# Patient Record
Sex: FEMALE | Race: WHITE | Employment: FULL TIME | URBAN - METROPOLITAN AREA
[De-identification: names, ages, dates, MRNs, and addresses within clinical notes are randomized per-mention and may not be internally consistent; named-entity substitution may affect disease eponyms.]

---

## 2017-03-29 DIAGNOSIS — M85.80 OSTEOPENIA: Primary | ICD-10-CM

## 2017-03-30 ENCOUNTER — HOSPITAL ENCOUNTER (OUTPATIENT)
Dept: INFUSION THERAPY | Age: 52
Discharge: HOME | End: 2017-03-30
Attending: INTERNAL MEDICINE | Admitting: INTERNAL MEDICINE
Payer: COMMERCIAL

## 2017-03-30 ENCOUNTER — OFFICE VISIT (OUTPATIENT)
Dept: ONCOLOGY | Age: 52
End: 2017-03-30
Payer: COMMERCIAL

## 2017-03-30 ENCOUNTER — HOSPITAL ENCOUNTER (OUTPATIENT)
Age: 52
Discharge: HOME | End: 2017-03-30
Attending: INTERNAL MEDICINE
Payer: COMMERCIAL

## 2017-03-30 VITALS
DIASTOLIC BLOOD PRESSURE: 95 MMHG | SYSTOLIC BLOOD PRESSURE: 135 MMHG | RESPIRATION RATE: 16 BRPM | TEMPERATURE: 97.9 F | HEART RATE: 63 BPM | WEIGHT: 136.1 LBS | BODY MASS INDEX: 24.89 KG/M2

## 2017-03-30 DIAGNOSIS — D05.12 DUCTAL CARCINOMA IN SITU OF LEFT BREAST: ICD-10-CM

## 2017-03-30 DIAGNOSIS — M85.80 OSTEOPENIA: ICD-10-CM

## 2017-03-30 DIAGNOSIS — C50.412 MALIGNANT NEOPLASM OF UPPER-OUTER QUADRANT OF LEFT FEMALE BREAST (HCC): ICD-10-CM

## 2017-03-30 DIAGNOSIS — N64.4 BREAST PAIN: ICD-10-CM

## 2017-03-30 DIAGNOSIS — C50.412 MALIGNANT NEOPLASM OF UPPER-OUTER QUADRANT OF LEFT FEMALE BREAST (HCC): Primary | ICD-10-CM

## 2017-03-30 LAB
ALBUMIN SERPL-MCNC: 4.6 G/DL (ref 3.5–5.2)
ALBUMIN/GLOBULIN RATIO: 1.4 (ref 1–2.5)
ALP BLD-CCNC: 58 U/L (ref 35–104)
ALT SERPL-CCNC: 18 U/L (ref 5–33)
ANION GAP SERPL CALCULATED.3IONS-SCNC: 15 MMOL/L (ref 9–17)
AST SERPL-CCNC: 23 U/L
BILIRUB SERPL-MCNC: 0.49 MG/DL (ref 0.3–1.2)
BUN BLDV-MCNC: 15 MG/DL (ref 6–20)
BUN/CREAT BLD: NORMAL (ref 9–20)
CALCIUM SERPL-MCNC: 9.5 MG/DL (ref 8.6–10.4)
CHLORIDE BLD-SCNC: 99 MMOL/L (ref 98–107)
CO2: 25 MMOL/L (ref 20–31)
CREAT SERPL-MCNC: 0.68 MG/DL (ref 0.5–0.9)
GFR AFRICAN AMERICAN: >60 ML/MIN
GFR NON-AFRICAN AMERICAN: >60 ML/MIN
GFR SERPL CREATININE-BSD FRML MDRD: NORMAL ML/MIN/{1.73_M2}
GFR SERPL CREATININE-BSD FRML MDRD: NORMAL ML/MIN/{1.73_M2}
GLUCOSE BLD-MCNC: 90 MG/DL (ref 70–99)
MAGNESIUM: 2 MG/DL (ref 1.6–2.6)
PHOSPHORUS: 3.1 MG/DL (ref 2.6–4.5)
POTASSIUM SERPL-SCNC: 3.8 MMOL/L (ref 3.7–5.3)
SODIUM BLD-SCNC: 139 MMOL/L (ref 135–144)
TOTAL PROTEIN: 7.8 G/DL (ref 6.4–8.3)

## 2017-03-30 PROCEDURE — 96401 CHEMO ANTI-NEOPL SQ/IM: CPT

## 2017-03-30 PROCEDURE — 80053 COMPREHEN METABOLIC PANEL: CPT

## 2017-03-30 PROCEDURE — 83735 ASSAY OF MAGNESIUM: CPT

## 2017-03-30 PROCEDURE — 36415 COLL VENOUS BLD VENIPUNCTURE: CPT

## 2017-03-30 PROCEDURE — 6360000002 HC RX W HCPCS: Performed by: INTERNAL MEDICINE

## 2017-03-30 PROCEDURE — 99214 OFFICE O/P EST MOD 30 MIN: CPT | Performed by: INTERNAL MEDICINE

## 2017-03-30 PROCEDURE — 84100 ASSAY OF PHOSPHORUS: CPT

## 2017-03-30 RX ORDER — METHYLPREDNISOLONE SODIUM SUCCINATE 125 MG/2ML
125 INJECTION, POWDER, LYOPHILIZED, FOR SOLUTION INTRAMUSCULAR; INTRAVENOUS ONCE
Status: CANCELLED | OUTPATIENT
Start: 2017-09-24 | End: 2017-09-24

## 2017-03-30 RX ORDER — 0.9 % SODIUM CHLORIDE 0.9 %
10 VIAL (ML) INJECTION ONCE
Status: CANCELLED | OUTPATIENT
Start: 2017-09-24 | End: 2017-09-24

## 2017-03-30 RX ORDER — SODIUM CHLORIDE 9 MG/ML
100 INJECTION, SOLUTION INTRAVENOUS CONTINUOUS
Status: CANCELLED | OUTPATIENT
Start: 2017-09-24

## 2017-03-30 RX ORDER — DIPHENHYDRAMINE HYDROCHLORIDE 50 MG/ML
50 INJECTION INTRAMUSCULAR; INTRAVENOUS ONCE
Status: CANCELLED | OUTPATIENT
Start: 2017-09-24 | End: 2017-09-24

## 2017-03-30 RX ADMIN — DENOSUMAB 60 MG: 60 INJECTION SUBCUTANEOUS at 11:55

## 2017-03-30 NOTE — PROGRESS NOTES
Pt here for Prolia injection. Labs reviewed. Pt was treated without incident and discharged in stable condition. Pt will return on 10/5/17 for her next Prolia injection.

## 2017-03-31 DIAGNOSIS — C50.419 MALIGNANT NEOPLASM OF UPPER-OUTER QUADRANT OF FEMALE BREAST, UNSPECIFIED LATERALITY: Primary | ICD-10-CM

## 2017-03-31 DIAGNOSIS — C50.412 MALIGNANT NEOPLASM OF UPPER-OUTER QUADRANT OF LEFT FEMALE BREAST (HCC): Primary | ICD-10-CM

## 2017-03-31 RX ORDER — TAMOXIFEN CITRATE 20 MG/1
20 TABLET ORAL DAILY
Qty: 90 TABLET | Refills: 3 | Status: SHIPPED | OUTPATIENT
Start: 2017-03-31 | End: 2018-07-23 | Stop reason: SDUPTHER

## 2017-03-31 RX ORDER — TRIAMTERENE AND HYDROCHLOROTHIAZIDE 37.5; 25 MG/1; MG/1
1 CAPSULE ORAL DAILY
Qty: 30 CAPSULE | Refills: 2 | Status: SHIPPED | OUTPATIENT
Start: 2017-03-31 | End: 2017-07-18 | Stop reason: SDUPTHER

## 2017-04-17 ENCOUNTER — HOSPITAL ENCOUNTER (OUTPATIENT)
Dept: MRI IMAGING | Age: 52
Discharge: HOME | End: 2017-04-17
Payer: COMMERCIAL

## 2017-04-17 DIAGNOSIS — C50.412 MALIGNANT NEOPLASM OF UPPER-OUTER QUADRANT OF LEFT FEMALE BREAST (HCC): ICD-10-CM

## 2017-04-17 PROCEDURE — C8908 MRI W/O FOL W/CONT, BREAST,: HCPCS

## 2017-04-17 PROCEDURE — 2580000003 HC RX 258: Performed by: INTERNAL MEDICINE

## 2017-04-17 PROCEDURE — A9579 GAD-BASE MR CONTRAST NOS,1ML: HCPCS | Performed by: INTERNAL MEDICINE

## 2017-04-17 PROCEDURE — 6360000004 HC RX CONTRAST MEDICATION: Performed by: INTERNAL MEDICINE

## 2017-04-17 RX ORDER — SODIUM CHLORIDE 0.9 % (FLUSH) 0.9 %
10 SYRINGE (ML) INJECTION PRN
Status: DISCONTINUED | OUTPATIENT
Start: 2017-04-17 | End: 2017-04-20 | Stop reason: HOSPADM

## 2017-04-17 RX ORDER — 0.9 % SODIUM CHLORIDE 0.9 %
100 INTRAVENOUS SOLUTION INTRAVENOUS ONCE
Status: COMPLETED | OUTPATIENT
Start: 2017-04-17 | End: 2017-04-17

## 2017-04-17 RX ADMIN — Medication 10 ML: at 17:58

## 2017-04-17 RX ADMIN — GADOPENTETATE DIMEGLUMINE 20 ML: 469.01 INJECTION INTRAVENOUS at 17:57

## 2017-04-17 RX ADMIN — SODIUM CHLORIDE 100 ML: 0.9 INJECTION, SOLUTION INTRAVENOUS at 17:58

## 2017-05-25 ENCOUNTER — EMPLOYEE WELLNESS (OUTPATIENT)
Dept: OTHER | Age: 52
End: 2017-05-25

## 2017-05-25 LAB
CHOLESTEROL/HDL RATIO: 3.3
CHOLESTEROL: 177 MG/DL
GLUCOSE BLD-MCNC: 92 MG/DL (ref 70–99)
HDLC SERPL-MCNC: 53 MG/DL
LDL CHOLESTEROL: 89 MG/DL (ref 0–130)
PATIENT FASTING?: YES
TRIGL SERPL-MCNC: 173 MG/DL
VLDLC SERPL CALC-MCNC: ABNORMAL MG/DL (ref 1–30)

## 2017-07-19 RX ORDER — TRIAMTERENE AND HYDROCHLOROTHIAZIDE 37.5; 25 MG/1; MG/1
CAPSULE ORAL
Qty: 30 CAPSULE | Refills: 0 | Status: SHIPPED | OUTPATIENT
Start: 2017-07-19 | End: 2017-08-31 | Stop reason: SDUPTHER

## 2017-08-30 ENCOUNTER — TELEPHONE (OUTPATIENT)
Dept: ONCOLOGY | Age: 52
End: 2017-08-30

## 2017-08-31 DIAGNOSIS — C50.419 MALIGNANT NEOPLASM OF UPPER-OUTER QUADRANT OF FEMALE BREAST, UNSPECIFIED LATERALITY: Primary | ICD-10-CM

## 2017-08-31 RX ORDER — TRIAMTERENE AND HYDROCHLOROTHIAZIDE 37.5; 25 MG/1; MG/1
1 CAPSULE ORAL DAILY
Qty: 90 CAPSULE | Refills: 3 | Status: SHIPPED | OUTPATIENT
Start: 2017-08-31 | End: 2018-09-20

## 2017-10-03 ENCOUNTER — OFFICE VISIT (OUTPATIENT)
Dept: FAMILY MEDICINE CLINIC | Age: 52
End: 2017-10-03
Payer: COMMERCIAL

## 2017-10-03 VITALS
SYSTOLIC BLOOD PRESSURE: 124 MMHG | DIASTOLIC BLOOD PRESSURE: 78 MMHG | WEIGHT: 143 LBS | HEIGHT: 62 IN | TEMPERATURE: 98 F | HEART RATE: 84 BPM | BODY MASS INDEX: 26.31 KG/M2 | OXYGEN SATURATION: 98 %

## 2017-10-03 DIAGNOSIS — Z00.00 ANNUAL PHYSICAL EXAM: Primary | ICD-10-CM

## 2017-10-03 DIAGNOSIS — N32.81 OVERACTIVE BLADDER: ICD-10-CM

## 2017-10-03 DIAGNOSIS — Z12.4 CERVICAL CANCER SCREENING: ICD-10-CM

## 2017-10-03 DIAGNOSIS — Z12.11 SCREEN FOR COLON CANCER: ICD-10-CM

## 2017-10-03 PROCEDURE — 99396 PREV VISIT EST AGE 40-64: CPT | Performed by: NURSE PRACTITIONER

## 2017-10-03 RX ORDER — SOLIFENACIN SUCCINATE 5 MG/1
5 TABLET, FILM COATED ORAL DAILY
Qty: 30 TABLET | Refills: 5 | Status: SHIPPED | OUTPATIENT
Start: 2017-10-03 | End: 2017-10-19 | Stop reason: ALTCHOICE

## 2017-10-03 ASSESSMENT — PATIENT HEALTH QUESTIONNAIRE - PHQ9
SUM OF ALL RESPONSES TO PHQ9 QUESTIONS 1 & 2: 0
1. LITTLE INTEREST OR PLEASURE IN DOING THINGS: 0
SUM OF ALL RESPONSES TO PHQ QUESTIONS 1-9: 0
2. FEELING DOWN, DEPRESSED OR HOPELESS: 0

## 2017-10-03 ASSESSMENT — ENCOUNTER SYMPTOMS
BLOOD IN STOOL: 0
SHORTNESS OF BREATH: 0
ABDOMINAL PAIN: 0

## 2017-10-03 NOTE — MR AVS SNAPSHOT
polyethylene glycol (GLYCOLAX) powder Take 17 g by mouth daily. Allergies              Adhesive Tape Other (See Comments)    Turned skin red    Codeine          Additional Information        Basic Information     Date Of Birth Sex Race Ethnicity Preferred Language    1965 Female White Non-/Non  English      Problem List as of 10/3/2017  Date Reviewed: 10/3/2017                Malignant neoplasm of upper-outer quadrant of female breast ([address]    Ductal carcinoma in situ of left breast    MVP (mitral valve prolapse)    Hepatic steatosis    Hyperlipidemia    Allergic rhinitis    Overactive bladder    Herpes zoster without complication    Osteopenia      Immunizations as of 10/3/2017     Name Date    Influenza Virus Vaccine 10/23/2015    Tdap (Boostrix, Adacel) 7/20/2007      Preventive Care        Date Due    HIV screening is recommended for all people regardless of risk factors  aged 15-65 years at least once (lifetime) who have never been HIV tested. 7/11/1982    Colonoscopy 7/11/2017    Tetanus Combination Vaccine (2 - Td) 7/20/2017    Yearly Flu Vaccine (1) 9/1/2017    Cholesterol Screening 2/5/2019    Pap Smear 4/11/2019            Guang Lian Shi Daihart Signup           Our records indicate that you have an active [employer] account. You can view your After Visit Summary by going to [email or web address] and logging in with your [employer] username and password. If you don't have a [employer] username and password but a parent or guardian has access to your record, the parent or guardian should login with their own [employer] username and password and access your record to view the After Visit Summary. Additional Information  If you have questions, please contact the physician practice where you receive care. Remember, [employer] is NOT to be used for urgent needs. For medical emergencies, dial 911. For questions regarding your [employer] account call [phone].  If you

## 2017-10-03 NOTE — PROGRESS NOTES
Subjective:      Patient ID: Ian Aiken is a 50 y.o. female. HPI  50year old white female presents with annual physical.   Hx of HTN and bp is stable with current therapy. States she doesn't exercise regularly for her job is very physical.  Also c/o nocturia for years. States she gets up frequently at night. Denies dysuria hematuria or flank abd pain. Hx of breast cancer and follows up with Sophia Harmon. The condition has been stable. Pt is nonsmoker drinks socially. No illicit drug use. Review of Systems   Constitutional: Negative for chills and fever. Eyes: Negative for visual disturbance. Respiratory: Negative for shortness of breath. Cardiovascular: Negative for chest pain. Gastrointestinal: Negative for abdominal pain and blood in stool. Endocrine: Negative for polydipsia and polyuria. Genitourinary: Negative for dysuria and hematuria. Noturia   Musculoskeletal: Negative for arthralgias and myalgias. Neurological: Negative for dizziness, weakness and numbness. Psychiatric/Behavioral: Negative for agitation and behavioral problems. Objective:   Physical Exam   Constitutional: She appears well-nourished. No distress. HENT:   Nose: Nose normal.   Mouth/Throat: Oropharynx is clear and moist.   Eyes: Conjunctivae are normal.   Neck: Neck supple. Cardiovascular: Normal rate, regular rhythm and normal heart sounds. Pulmonary/Chest: Effort normal and breath sounds normal. No respiratory distress. Abdominal: Soft. There is no tenderness. Musculoskeletal: Normal range of motion. Lymphadenopathy:     She has no cervical adenopathy. Neurological: She is alert. No cranial nerve deficit. Skin: Skin is warm and dry. Psychiatric: She has a normal mood and affect. Her behavior is normal.   Nursing note and vitals reviewed. Assessment:      1. Annual physical exam    2. Overactive bladder    3. Screen for colon cancer    4.  Cervical cancer screening            Plan: BP Readings from Last 3 Encounters:   10/03/17 124/78   03/30/17 (!) 135/95   09/29/16 (!) 145/99     /78  Pulse 84  Temp 98 °F (36.7 °C)  Ht 5' 2\" (1.575 m)  Wt 143 lb (64.9 kg)  SpO2 98%  BMI 26.16 kg/m2  Lab Results   Component Value Date    WBC 7.2 06/11/2015    HGB 14.4 06/11/2015    HCT 42.0 06/11/2015     06/11/2015    CHOL 177 05/25/2017    TRIG 173 (H) 05/25/2017    HDL 53 05/25/2017    ALT 18 03/30/2017    AST 23 03/30/2017     03/30/2017    K 3.8 03/30/2017    CL 99 03/30/2017    CREATININE 0.68 03/30/2017    BUN 15 03/30/2017    CO2 25 03/30/2017     Lab Results   Component Value Date    CALCIUM 9.5 03/30/2017    PHOS 3.1 03/30/2017     Lab Results   Component Value Date    LDLCHOLESTEROL 89 05/25/2017         1. Annual physical exam    - CBC Auto Differential; Future  - Comprehensive Metabolic Panel; Future  - Lipid Panel; Future  - TSH with Reflex; Future    2. Overactive bladder  - check UA. - start Vesicare. Follow up in 2 months,.     3. Screen for colon cancer  - Romina Hatch MD, [facility] [place]    4. Cervical cancer screening  - Zane Redmond DO, OB/GYN [address]    Requested Prescriptions     Signed Prescriptions Disp Refills    solifenacin (VESICARE) 5 MG tablet 30 tablet 5     Sig: Take 1 tablet by mouth daily       Medications Discontinued During This Encounter   Medication Reason    FERROUS SULFATE-FOLIC ACID PO Therapy completed       Natan Windermere received counseling on the following healthy behaviors: nutrition, exercise and weight reduction   Reviewed prior labs and health maintenance. Continue current medications, diet and exercise. Discussed use, benefit, and side effects of prescribed medications. Barriers to medication compliance addressed. Patient given educational materials - see patient instructions. All patient questions answered. Patient voiced understanding.

## 2017-10-03 NOTE — PROGRESS NOTES
Pt is here today for a physical.       Pt is speaking about bladder leakage that has been going on since she had kids. Pt also states that she is getting up every hour at night time. Visit Information    Have you changed or started any medications since your last visit including any over-the-counter medicines, vitamins, or herbal medicines? no   Are you having any side effects from any of your medications? -  no  Have you stopped taking any of your medications? Is so, why? -  no    Have you seen any other physician or provider since your last visit? No  Have you had any other diagnostic tests since your last visit? No  Have you been seen in the emergency room and/or had an admission to a hospital since we last saw you? No  Have you had your routine dental cleaning in the past 6 months? no    Have you activated your [employer] account? If not, what are your barriers?  Yes     Patient Care Team:  Young Urena MD as PCP - Winnie Paget, MD as PCP - S Attributed Provider  Jose Lewis DPM as Consulting Physician  Chapito Carver MD as Consulting Physician (Hematology and Oncology)  Pedro Pablo Mckenzie DO as Consulting Physician (Obstetrics & Gynecology)  Dany Cardenas MD as Consulting Physician (Gastroenterology)  Hodan Burks MD as Surgeon (General Surgery)  Salbador Alvarado RN as Nurse Navigator (Oncology)    Medical History Review  Past Medical, Family, and Social History reviewed and does contribute to the patient presenting condition    Health Maintenance   Topic Date Due    HIV screen  07/11/1982    Colon cancer screen colonoscopy  07/11/2017    DTaP/Tdap/Td vaccine (2 - Td) 07/20/2017    Flu vaccine (1) 09/01/2017    Lipid screen  02/05/2019    Cervical cancer screen  04/11/2019

## 2017-10-04 DIAGNOSIS — C50.419 MALIGNANT NEOPLASM OF UPPER-OUTER QUADRANT OF FEMALE BREAST, UNSPECIFIED LATERALITY: Primary | ICD-10-CM

## 2017-10-05 RX ORDER — METHYLPREDNISOLONE SODIUM SUCCINATE 125 MG/2ML
125 INJECTION, POWDER, LYOPHILIZED, FOR SOLUTION INTRAMUSCULAR; INTRAVENOUS ONCE
Status: CANCELLED | OUTPATIENT
Start: 2017-10-05 | End: 2017-10-05

## 2017-10-05 RX ORDER — SODIUM CHLORIDE 9 MG/ML
100 INJECTION, SOLUTION INTRAVENOUS CONTINUOUS
Status: CANCELLED | OUTPATIENT
Start: 2017-10-05

## 2017-10-05 RX ORDER — 0.9 % SODIUM CHLORIDE 0.9 %
10 VIAL (ML) INJECTION ONCE
Status: CANCELLED | OUTPATIENT
Start: 2017-10-05 | End: 2017-10-05

## 2017-10-05 RX ORDER — DIPHENHYDRAMINE HYDROCHLORIDE 50 MG/ML
50 INJECTION INTRAMUSCULAR; INTRAVENOUS ONCE
Status: CANCELLED | OUTPATIENT
Start: 2017-10-05 | End: 2017-10-05

## 2017-10-06 ENCOUNTER — HOSPITAL ENCOUNTER (OUTPATIENT)
Dept: INFUSION THERAPY | Age: 52
Discharge: HOME | End: 2017-10-06
Payer: COMMERCIAL

## 2017-10-06 DIAGNOSIS — M85.80 OSTEOPENIA, UNSPECIFIED LOCATION: ICD-10-CM

## 2017-10-06 DIAGNOSIS — D05.12 DUCTAL CARCINOMA IN SITU OF LEFT BREAST: ICD-10-CM

## 2017-10-06 DIAGNOSIS — C50.412 MALIGNANT NEOPLASM OF UPPER-OUTER QUADRANT OF LEFT FEMALE BREAST (HCC): ICD-10-CM

## 2017-10-06 PROCEDURE — 96401 CHEMO ANTI-NEOPL SQ/IM: CPT

## 2017-10-06 PROCEDURE — 6360000002 HC RX W HCPCS: Performed by: INTERNAL MEDICINE

## 2017-10-06 RX ADMIN — DENOSUMAB 60 MG: 60 INJECTION SUBCUTANEOUS at 16:28

## 2017-10-19 ENCOUNTER — HOSPITAL ENCOUNTER (OUTPATIENT)
Age: 52
Discharge: HOME | End: 2017-10-19
Payer: COMMERCIAL

## 2017-10-19 ENCOUNTER — TELEPHONE (OUTPATIENT)
Dept: FAMILY MEDICINE CLINIC | Age: 52
End: 2017-10-19

## 2017-10-19 DIAGNOSIS — Z00.00 ANNUAL PHYSICAL EXAM: ICD-10-CM

## 2017-10-19 LAB
ABSOLUTE EOS #: 0.1 K/UL (ref 0–0.4)
ABSOLUTE IMMATURE GRANULOCYTE: NORMAL K/UL (ref 0–0.3)
ABSOLUTE LYMPH #: 2.2 K/UL (ref 1–4.8)
ABSOLUTE MONO #: 0.5 K/UL (ref 0.1–1.3)
ALBUMIN SERPL-MCNC: 4.6 G/DL (ref 3.5–5.2)
ALBUMIN/GLOBULIN RATIO: NORMAL (ref 1–2.5)
ALP BLD-CCNC: 45 U/L (ref 35–104)
ALT SERPL-CCNC: 20 U/L (ref 5–33)
ANION GAP SERPL CALCULATED.3IONS-SCNC: 14 MMOL/L (ref 9–17)
AST SERPL-CCNC: 24 U/L
BASOPHILS # BLD: 1 %
BASOPHILS ABSOLUTE: 0.1 K/UL (ref 0–0.2)
BILIRUB SERPL-MCNC: 0.43 MG/DL (ref 0.3–1.2)
BUN BLDV-MCNC: 15 MG/DL (ref 6–20)
BUN/CREAT BLD: NORMAL (ref 9–20)
CALCIUM SERPL-MCNC: 9.7 MG/DL (ref 8.6–10.4)
CHLORIDE BLD-SCNC: 98 MMOL/L (ref 98–107)
CHOLESTEROL/HDL RATIO: 3.7
CHOLESTEROL: 211 MG/DL
CO2: 26 MMOL/L (ref 20–31)
CREAT SERPL-MCNC: 0.63 MG/DL (ref 0.5–0.9)
DIFFERENTIAL TYPE: NORMAL
EOSINOPHILS RELATIVE PERCENT: 2 %
GFR AFRICAN AMERICAN: >60 ML/MIN
GFR NON-AFRICAN AMERICAN: >60 ML/MIN
GFR SERPL CREATININE-BSD FRML MDRD: NORMAL ML/MIN/{1.73_M2}
GFR SERPL CREATININE-BSD FRML MDRD: NORMAL ML/MIN/{1.73_M2}
GLUCOSE BLD-MCNC: 90 MG/DL (ref 70–99)
HCT VFR BLD CALC: 42.9 % (ref 36–46)
HDLC SERPL-MCNC: 57 MG/DL
HEMOGLOBIN: 15.2 G/DL (ref 12–16)
IMMATURE GRANULOCYTES: NORMAL %
LDL CHOLESTEROL: 134 MG/DL (ref 0–130)
LYMPHOCYTES # BLD: 33 %
MCH RBC QN AUTO: 30.9 PG (ref 26–34)
MCHC RBC AUTO-ENTMCNC: 35.5 G/DL (ref 31–37)
MCV RBC AUTO: 86.9 FL (ref 80–100)
MONOCYTES # BLD: 7 %
PDW BLD-RTO: 12.6 % (ref 11.5–14.9)
PLATELET # BLD: 332 K/UL (ref 150–450)
PLATELET ESTIMATE: NORMAL
PMV BLD AUTO: 6.8 FL (ref 6–12)
POTASSIUM SERPL-SCNC: 3.7 MMOL/L (ref 3.7–5.3)
RBC # BLD: 4.94 M/UL (ref 4–5.2)
RBC # BLD: NORMAL 10*6/UL
SEG NEUTROPHILS: 57 %
SEGMENTED NEUTROPHILS ABSOLUTE COUNT: 3.8 K/UL (ref 1.3–9.1)
SODIUM BLD-SCNC: 138 MMOL/L (ref 135–144)
TOTAL PROTEIN: 8 G/DL (ref 6.4–8.3)
TRIGL SERPL-MCNC: 101 MG/DL
TSH SERPL DL<=0.05 MIU/L-ACNC: 3.26 MIU/L (ref 0.3–5)
VLDLC SERPL CALC-MCNC: ABNORMAL MG/DL (ref 1–30)
WBC # BLD: 6.6 K/UL (ref 3.5–11)
WBC # BLD: NORMAL 10*3/UL

## 2017-10-19 PROCEDURE — 84443 ASSAY THYROID STIM HORMONE: CPT

## 2017-10-19 PROCEDURE — 80053 COMPREHEN METABOLIC PANEL: CPT

## 2017-10-19 PROCEDURE — 36415 COLL VENOUS BLD VENIPUNCTURE: CPT

## 2017-10-19 PROCEDURE — 85025 COMPLETE CBC W/AUTO DIFF WBC: CPT

## 2017-10-19 PROCEDURE — 80061 LIPID PANEL: CPT

## 2017-10-19 RX ORDER — OXYBUTYNIN CHLORIDE 10 MG/1
10 TABLET, EXTENDED RELEASE ORAL DAILY
Qty: 30 TABLET | Refills: 3 | Status: SHIPPED | OUTPATIENT
Start: 2017-10-19 | End: 2019-01-03

## 2017-10-19 NOTE — TELEPHONE ENCOUNTER
Left message for the patient that new medication was sent to [facility] for her.

## 2017-10-24 ENCOUNTER — OFFICE VISIT (OUTPATIENT)
Dept: ONCOLOGY | Age: 52
End: 2017-10-24
Payer: COMMERCIAL

## 2017-10-24 VITALS
TEMPERATURE: 97.9 F | RESPIRATION RATE: 16 BRPM | SYSTOLIC BLOOD PRESSURE: 131 MMHG | WEIGHT: 147.9 LBS | HEART RATE: 58 BPM | DIASTOLIC BLOOD PRESSURE: 92 MMHG | BODY MASS INDEX: 27.05 KG/M2

## 2017-10-24 DIAGNOSIS — C50.419 MALIGNANT NEOPLASM OF UPPER-OUTER QUADRANT OF BREAST IN FEMALE, ESTROGEN RECEPTOR POSITIVE, UNSPECIFIED LATERALITY (HCC): Primary | ICD-10-CM

## 2017-10-24 DIAGNOSIS — D05.12 DUCTAL CARCINOMA IN SITU OF LEFT BREAST: ICD-10-CM

## 2017-10-24 DIAGNOSIS — Z17.0 MALIGNANT NEOPLASM OF UPPER-OUTER QUADRANT OF BREAST IN FEMALE, ESTROGEN RECEPTOR POSITIVE, UNSPECIFIED LATERALITY (HCC): Primary | ICD-10-CM

## 2017-10-24 PROCEDURE — 99214 OFFICE O/P EST MOD 30 MIN: CPT | Performed by: INTERNAL MEDICINE

## 2017-10-24 PROCEDURE — 99211 OFF/OP EST MAY X REQ PHY/QHP: CPT

## 2017-10-24 RX ORDER — MULTIVIT WITH MINERALS/LUTEIN
250 TABLET ORAL DAILY
COMMUNITY

## 2017-10-24 NOTE — PROGRESS NOTES
dysuria, hematuria, frequency, urgency. Occasional stress incotinence  Neurological: Denies headaches, decreased LOC, no sensory or motor focal deficits. Musculoskeletal:  No arthralgia no back pain or joint swelling , mild discomfort in the shoulder and pectroalis area, relieved by rest    Skin: There are hives present, no bleeding. Psychiatric:  No anxiety, no depression. Endocrine: no diabetes or thyroid disease. Hematologic: no bleeding , no adenopathy. PHYSICAL EXAM:  The patient is not in acute distress. Vital signs: Blood pressure (!) 131/92, pulse 58, temperature 97.9 °F (36.6 °C), temperature source Oral, resp. rate 16, weight 147 lb 14.4 oz (67.1 kg), not currently breastfeeding. , last menstrual period 03/01/2014. HEENT:  Eyes are normal. Ears, nose and throat are normal.  Neck: Supple. No lymph node enlargement. No thyroid enlargement. Trachea is centrally located. Chest: Clear to auscultation. No wheezes or crepitations. Breast: Recent bilateral mastectomy, Bilateral breast implants. No evidence of infection. In 3 o'clock position of Left breast some tissue thickening,  Heart: Regular sinus rhythm. Abdomen: Soft, nontender. No hepatosplenomegaly. No masses. Extremities:  With no edema. Lymph Nodes:  No cervical, axillary or inguinal lymph node enlargement. No lymphedema. Neurologic:  Conscious and oriented. No focal neurological deficits. Psychosocial: No depression, anxiety or stress. Skin: No rashes, bruises or ecchymoses.        REVIEW OF LABORATORY DATA:   Lab Results   Component Value Date    WBC 6.6 10/19/2017    HGB 15.2 10/19/2017    HCT 42.9 10/19/2017    MCV 86.9 10/19/2017     10/19/2017       Chemistry        Component Value Date/Time     10/19/2017 1226    K 3.7 10/19/2017 1226    CL 98 10/19/2017 1226    CO2 26 10/19/2017 1226    BUN 15 10/19/2017 1226    CREATININE 0.63 10/19/2017 1226        Component Value Date/Time    CALCIUM 9.7 10/19/2017 1226

## 2017-10-24 NOTE — LETTER
MCV 86.9 10/19/2017     10/19/2017       Chemistry        Component Value Date/Time     10/19/2017 1226    K 3.7 10/19/2017 1226    CL 98 10/19/2017 1226    CO2 26 10/19/2017 1226    BUN 15 10/19/2017 1226    CREATININE 0.63 10/19/2017 1226        Component Value Date/Time    CALCIUM 9.7 10/19/2017 1226    ALKPHOS 45 10/19/2017 1226    AST 24 10/19/2017 1226    ALT 20 10/19/2017 1226    BILITOT 0.43 10/19/2017 1226        REVIEW OF RADIOLOGICAL RESULTS:  06/22/2015 DEXA BONE SCAN IMPRESSION:  1. Lumbar spine:   L1-L4 BMD:  0.933 g/cm2, young adult/T.-score: -2.1. Age-matched Z.-score: -1.8   No prior studies.       2 - left hip:   Total: 1.046g/cm2, young adult/T.-score: 0.3. Age-matched Z.-score: 0.8. Neck:1.004g/cm2, young adult/T.-score:-0.2. Age-matched Z.-score: 0.5. No prior studies. IMPRESSION:   1. Stage 1(T1c, N0,M0) invasive ductal cancer, ER positive, HER2 negative. S/P bilateral mastectomy   2. High recurrence score on Oncotype study. Received chemo. 3. Mitral valve prolapse, stable. 4. High BP, watching BP at home. She states BP is better at home. I think she needs meds, but she wants to wait. Defer to PCP   5. On tamoxifen, well tolerated   6. Osteopenia, on prolia. PLAN:   1. We discussed exercises to help combat her occasional stress incontinence. 2. She continues to do quite well 3.5 years in remission. Return in 6 months. If you have questions, please do not hesitate to call me. I look forward to following Natan Nance along with you.     Sincerely,    Norma Oconnell MD  Hematology/ Oncology  Cell [phone]

## 2017-10-31 DIAGNOSIS — Z12.11 COLON CANCER SCREENING: Primary | ICD-10-CM

## 2017-10-31 RX ORDER — POLYETHYLENE GLYCOL 3350 17 G/17G
POWDER, FOR SOLUTION ORAL
Qty: 255 G | Refills: 0 | Status: SHIPPED | OUTPATIENT
Start: 2017-10-31 | End: 2017-11-30

## 2017-11-18 ENCOUNTER — HOSPITAL ENCOUNTER (OUTPATIENT)
Age: 52
Setting detail: OUTPATIENT SURGERY
Discharge: HOME | End: 2017-11-18
Attending: INTERNAL MEDICINE | Admitting: INTERNAL MEDICINE
Payer: COMMERCIAL

## 2017-11-18 VITALS
BODY MASS INDEX: 27.05 KG/M2 | DIASTOLIC BLOOD PRESSURE: 67 MMHG | TEMPERATURE: 97.3 F | RESPIRATION RATE: 17 BRPM | WEIGHT: 147 LBS | HEART RATE: 55 BPM | HEIGHT: 62 IN | OXYGEN SATURATION: 100 % | SYSTOLIC BLOOD PRESSURE: 107 MMHG

## 2017-11-18 PROCEDURE — 3609027000 HC COLONOSCOPY: Performed by: INTERNAL MEDICINE

## 2017-11-18 PROCEDURE — 99153 MOD SED SAME PHYS/QHP EA: CPT | Performed by: INTERNAL MEDICINE

## 2017-11-18 PROCEDURE — 7100000010 HC PHASE II RECOVERY - FIRST 15 MIN: Performed by: INTERNAL MEDICINE

## 2017-11-18 PROCEDURE — 2580000003 HC RX 258: Performed by: INTERNAL MEDICINE

## 2017-11-18 PROCEDURE — 6360000002 HC RX W HCPCS: Performed by: INTERNAL MEDICINE

## 2017-11-18 PROCEDURE — 7100000011 HC PHASE II RECOVERY - ADDTL 15 MIN: Performed by: INTERNAL MEDICINE

## 2017-11-18 PROCEDURE — 99152 MOD SED SAME PHYS/QHP 5/>YRS: CPT | Performed by: INTERNAL MEDICINE

## 2017-11-18 RX ORDER — SODIUM CHLORIDE 9 MG/ML
INJECTION, SOLUTION INTRAVENOUS CONTINUOUS
Status: DISCONTINUED | OUTPATIENT
Start: 2017-11-18 | End: 2017-11-18 | Stop reason: HOSPADM

## 2017-11-18 RX ORDER — FENTANYL CITRATE 50 UG/ML
INJECTION, SOLUTION INTRAMUSCULAR; INTRAVENOUS PRN
Status: DISCONTINUED | OUTPATIENT
Start: 2017-11-18 | End: 2017-11-18 | Stop reason: HOSPADM

## 2017-11-18 RX ORDER — MIDAZOLAM HYDROCHLORIDE 1 MG/ML
INJECTION INTRAMUSCULAR; INTRAVENOUS PRN
Status: DISCONTINUED | OUTPATIENT
Start: 2017-11-18 | End: 2017-11-18 | Stop reason: HOSPADM

## 2017-11-18 RX ADMIN — SODIUM CHLORIDE: 9 INJECTION, SOLUTION INTRAVENOUS at 08:59

## 2017-11-18 ASSESSMENT — PAIN - FUNCTIONAL ASSESSMENT: PAIN_FUNCTIONAL_ASSESSMENT: 0-10

## 2017-11-18 NOTE — H&P
tract: No abdominal pain, nausea, vomiting, diarrhea or constipation. Genitourinary:  No burning on micturition. No hesitancy, urgency, frequency or discoloration of urine. Urine leakage with cough or sneeze. Locomotor:  No bone or joint pains. No swelling. Right foot fracture, occasional pain. Neuropsychiatric:  No referable complaints. See HPI. GENERAL PHYSICAL EXAM:     Vitals: /83   Pulse 67   Temp 97.3 °F (36.3 °C) (Oral)   Resp 15   Ht 5' 2\" (1.575 m)   Wt 147 lb (66.7 kg)   SpO2 98%   BMI 26.89 kg/m²  Body mass index is 26.89 kg/m². GENERAL APPEARANCE:   Sebastien Dawkins is 50 y.o.,  female, not obese, nourished, conscious, alert. Does not appear to be distress or pain at this time. SKIN:  Warm, dry, no cyanosis or jaundice. HEAD:  Normocephalic, atraumatic, no swelling or tenderness. EYES:  Pupils equal, reactive to light. EARS:  No discharge, no marked hearing loss. NOSE:  No rhinorrhea, epistaxis or septal deformity. THROAT:  Not congested. No ulceration bleeding or discharge. NECK:  No stiffness, trachea central.  No palpable masses or L.N.                 CHEST:  Symmetrical and equal on expansion. HEART:  RRR S1 > S2. No audible murmurs or gallops. LUNGS:  Equal on expansion, normal breath sounds. No adventitious sounds. ABDOMEN:    Soft on palpation. No localized tenderness. No guarding or rigidity. No palpable organomegaly. LYMPHATICS:  No palpable cervical lymphadenopathy. LOCOMOTOR, BACK AND SPINE:  No tenderness or deformities. EXTREMITIES:  Symmetrical, no pedal edema. Manasas sign negative. No discoloration or ulcerations.     NEUROLOGIC:  The patient is conscious, alert, oriented, No apparent focal sensory or motor deficits.                                                                                      PROVISIONAL DIAGNOSES / SURGERY:      Colonoscopy    Patient Active Problem List    Diagnosis Date Noted    Ductal carcinoma in situ of left breast 01/31/2014     Priority: High    Malignant neoplasm of upper-outer quadrant of female breast ([facility]      Priority: High    MVP (mitral valve prolapse) 07/12/2013     Priority: High    Hepatic steatosis 01/31/2014     Priority: Medium    Hyperlipidemia 07/12/2013     Priority: Low    Allergic rhinitis 07/12/2013     Priority: Low    Overactive bladder 10/03/2017    Herpes zoster without complication 19/06/5994    Osteopenia 09/07/2015         ASA 1,  MALLALMPATI 1  KIMMY LUNDBERG NP on 11/18/2017 at 9:19 AM

## 2017-11-18 NOTE — PROGRESS NOTES
Pt. Up in chair, going over D/C instructions with , became nauseated @ 1310. Back on cart for 15 minutes, warm blanket to abd., cool cloth on forehead. 1330 sitting on side of cart, drinking cranberry juice, \"feel much better now\". . Pallor gone.

## 2017-11-28 ENCOUNTER — HOSPITAL ENCOUNTER (OUTPATIENT)
Age: 52
Setting detail: SPECIMEN
Discharge: HOME | End: 2017-11-28
Payer: COMMERCIAL

## 2017-11-28 ENCOUNTER — OFFICE VISIT (OUTPATIENT)
Dept: OBGYN CLINIC | Age: 52
End: 2017-11-28
Payer: COMMERCIAL

## 2017-11-28 VITALS
HEIGHT: 62 IN | RESPIRATION RATE: 16 BRPM | BODY MASS INDEX: 26.68 KG/M2 | DIASTOLIC BLOOD PRESSURE: 70 MMHG | HEART RATE: 72 BPM | SYSTOLIC BLOOD PRESSURE: 116 MMHG | WEIGHT: 145 LBS

## 2017-11-28 DIAGNOSIS — Z01.419 WELL FEMALE EXAM WITH ROUTINE GYNECOLOGICAL EXAM: Primary | ICD-10-CM

## 2017-11-28 DIAGNOSIS — Z01.419 WELL FEMALE EXAM WITH ROUTINE GYNECOLOGICAL EXAM: ICD-10-CM

## 2017-11-28 DIAGNOSIS — Z11.51 SPECIAL SCREENING EXAMINATION FOR HUMAN PAPILLOMAVIRUS (HPV): ICD-10-CM

## 2017-11-28 DIAGNOSIS — Z85.3 PERSONAL HISTORY OF BREAST CANCER: ICD-10-CM

## 2017-11-28 DIAGNOSIS — M85.80 OSTEOPENIA, UNSPECIFIED LOCATION: ICD-10-CM

## 2017-11-28 PROCEDURE — G0145 SCR C/V CYTO,THINLAYER,RESCR: HCPCS

## 2017-11-28 PROCEDURE — 87624 HPV HI-RISK TYP POOLED RSLT: CPT

## 2017-11-28 PROCEDURE — 99386 PREV VISIT NEW AGE 40-64: CPT | Performed by: ADVANCED PRACTICE MIDWIFE

## 2017-11-28 ASSESSMENT — PATIENT HEALTH QUESTIONNAIRE - PHQ9
SUM OF ALL RESPONSES TO PHQ9 QUESTIONS 1 & 2: 0
SUM OF ALL RESPONSES TO PHQ QUESTIONS 1-9: 0
2. FEELING DOWN, DEPRESSED OR HOPELESS: 0
1. LITTLE INTEREST OR PLEASURE IN DOING THINGS: 0

## 2017-11-28 NOTE — PROGRESS NOTES
History and Physical  [address], [address] [facility] [address] [phone]   Fax [phone]  Namita Todd  11/28/2017              50 y.o. Chief Complaint   Patient presents with   Tova Read Doctor    Annual Exam       Patient's last menstrual period was 11/20/2017. Primary Care Physician: Michelle Parson MD    The patient was seen and examined. She has no chief complaint today and is here for her annual exam.  Her bowels are regular. There are no voiding complaints. She denies any bloating. She denies vaginal discharge and was counseled on STD's and the need for barrier contraception. HPI : Namita Todd is a 50 y.o. female No obstetric history on file.     Gyn exam, No complaints but reports breast cancer bilaterally 2014, ? + for Estrogen receptive and progesterone receptive gene   ________________________________________________________________________  Obstetric History     No data available        Past Medical History:   Diagnosis Date    Allergic rhinitis 7/12/2013    Cancer ([address] 2014    Peg Stagers /  double mastectomy    Constipation     GERD (gastroesophageal reflux disease)     Hepatic steatosis 1/31/2014    History of chemotherapy 4/2013    4 treatments 2-3 weeks apart (4 total treatments)    HTN (hypertension)     Hyperlipidemia 7/12/2013    MVP (mitral valve prolapse) 7/12/2013    Port catheter in place    Narsaq catheter in place     Stress incontinence     Wears glasses     reading glasses                                                                   Past Surgical History:   Procedure Laterality Date    BREAST BIOPSY Left     x 2    BREAST RECONSTRUCTION Bilateral 02/19/14    CENTRAL VENOUS CATHETER  7/24/14    removal of port Dr Ruth Chen Bilateral 02/19/14    Dr Love Fragoso HISTORY Bilateral 1/21/15    nipple reconstruction    GA COLON CA SCRN NOT  [address] IND N/A 11/18/2017    COLONOSCOPY performed by Shala Correia MD at [facility]      Dr Kaylen Villalpando     Family History   Problem Relation Age of Onset    High Blood Pressure Mother     Allergies Mother     Heart Disease Father      bypass x5    High Blood Pressure Father     Diabetes Father     Allergies Brother      Social History     Social History    Marital status:      Spouse name: N/A    Number of children: N/A    Years of education: N/A     Occupational History    Not on file. Social History Main Topics    Smoking status: Never Smoker    Smokeless tobacco: Never Used    Alcohol use 0.0 oz/week      Comment: rarely    Drug use: No    Sexual activity: Yes     Partners: Male     Other Topics Concern    Not on file     Social History Narrative    No narrative on file       MEDICATIONS:  Current Outpatient Prescriptions   Medication Sig Dispense Refill    polyethylene glycol (GLYCOLAX) powder Please dispense with 4 dulcolax tabs with this prescription. Use as directed by following your patient instructions given by office. 255 g 0    MULTIPLE VITAMIN PO Take by mouth      Ascorbic Acid (VITAMIN C) 250 MG tablet Take 250 mg by mouth daily      oxybutynin (DITROPAN XL) 10 MG extended release tablet Take 1 tablet by mouth daily 30 tablet 3    triamterene-hydrochlorothiazide (DYAZIDE) 37.5-25 MG per capsule Take 1 capsule by mouth daily 90 capsule 3    tamoxifen (NOLVADEX) 20 MG tablet Take 1 tablet by mouth daily 90 tablet 3    aspirin-acetaminophen-caffeine (EXCEDRIN MIGRAINE) 250-250-65 MG per tablet Take 1 tablet by mouth every 6 hours as needed for Headaches      calcium carbonate (OYSTER SHELL CALCIUM 500 MG) 1250 MG tablet Take 1 tablet by mouth daily      polyethylene glycol (GLYCOLAX) powder Take 17 g by mouth daily. No current facility-administered medications for this visit.             ALLERGIES:  Allergies as of 11/28/2017 - Review ([facility] 2014    Ova Randolph /  double mastectomy    Constipation     GERD (gastroesophageal reflux disease)     Hepatic steatosis 1/31/2014    History of chemotherapy 4/2013    4 treatments 2-3 weeks apart (4 total treatments)    HTN (hypertension)     Hyperlipidemia 7/12/2013    MVP (mitral valve prolapse) 7/12/2013    Port catheter in place    Narsaq catheter in place     Stress incontinence     Wears glasses     reading glasses         Patient Active Problem List    Diagnosis Date Noted    Ductal carcinoma in situ of left breast 01/31/2014     Priority: High    Malignant neoplasm of upper-outer quadrant of female breast ([facility]      Priority: High     bresat Lt /  double mastectomy      MVP (mitral valve prolapse) 07/12/2013     Priority: High    Hepatic steatosis 01/31/2014     Priority: Medium    Hyperlipidemia 07/12/2013     Priority: Low    Allergic rhinitis 07/12/2013     Priority: Low    Overactive bladder 10/03/2017    Herpes zoster without complication 17/87/3382    Osteopenia 09/07/2015          Hereditary Breast, Ovarian, Colon and Uterine Cancer screening Done. Tobacco & Secondary smoke risks reviewed; instructed on cessation and avoidance      Counseling Completed:  Preventative Health Recommendations and Follow up. The patient was informed of the recommended preventative health recommendations. 1. Annuals every year; Cytology collections per prevailing guidelines. 2. Mammograms begin every year at 37 yo if no abnormalities are found and no family     History. 3. Bone density studies every 2-3 years. Begin at 71 yo. If no fracture history or osteoporosis family history. (significant). 4. Colonoscopy begin at 49 yo. Repeat every ten years if negative and no family history. 5. Calcium of 6450-2303 mg/day in split dosing  6. Vitamin D 400-800 IU/day  7.  All other preventative health recommendations will be managed by the patients Primary care physician. PLAN:  Return for Dr Virgie Estraad only, patient on tamoxifen . Pelvic U/S ordered  Dexa Scan ordered  PAP collected  Repeat Annual every 1 year  Cervical Cytology Evaluation begins at 24years old. If Negative Cytology, Follow-up screening per current guidelines. Mammograms every 1 year. If 35 yo and last mammogram was negative. Calcium and Vitamin D dosing reviewed. Colonoscopy screening reviewed as well as onset for bone density testing. Birth control and barrier recommendations discussed. STD counseling and prevention reviewed. Gardisil counseling completed for all patients 7-35 yo. Routine health maintenance per patients PCP. Orders Placed This Encounter   Procedures    US Non OB Transvaginal     Begin with transabdominal imaging. Standing Status:   Future     Standing Expiration Date:   11/28/2018     Order Specific Question:   Reason for exam:     Answer:   abnormal bleeding    HM DEXA SCAN     Standing Status:   Future     Standing Expiration Date:   5/27/2018    PAP SMEAR     Patient History:    Patient's last menstrual period was 11/20/2017.   OBGYN Status: Having periods  Past Surgical History:  No date: BREAST BIOPSY Left      Comment: x 2  02/19/14: BREAST RECONSTRUCTION Bilateral  7/24/14: CENTRAL VENOUS CATHETER      Comment: removal of port Dr Alicia Crane  02/19/14: MASTECTOMY Bilateral      Comment: Dr Alicia Crane  1/21/15: OTHER SURGICAL HISTORY Bilateral      Comment: nipple reconstruction  11/18/2017: [place] COLON CA SCRN NOT HI [facility] IND N/A      Comment: COLONOSCOPY performed by Karlene Pereira MD at [address] date: TUNNELED VENOUS PORT PLACEMENT      Comment: Dr Tigre Barney      Smoking status: Never Smoker                                                              Smokeless tobacco: Never Used                           Standing Status:   Future     Standing Expiration Date:   11/28/2018     Order Specific Question:   Collection Type Answer: Thin Prep     Order Specific Question:   Prior Abnormal Pap Test     Answer:   No     Order Specific Question:   Screening or Diagnostic     Answer:   Screening     Order Specific Question:   HPV Requested?      Answer:   Yes     Order Specific Question:   High Risk Patient     Answer:   N/A

## 2017-11-29 ENCOUNTER — TELEPHONE (OUTPATIENT)
Dept: ONCOLOGY | Age: 52
End: 2017-11-29

## 2017-11-29 LAB
HPV SAMPLE: NORMAL
HPV SOURCE: NORMAL
HPV, GENOTYPE 16: NOT DETECTED
HPV, GENOTYPE 18: NOT DETECTED
HPV, HIGH RISK OTHER: NOT DETECTED
HPV, INTERPRETATION: NORMAL

## 2017-11-29 NOTE — TELEPHONE ENCOUNTER
Patient called and left a message. She wanted to know if her cancer was estrogen based. She had seen a new OB Gyn for an exam recently. She saw the office NP. The NP had asked her and she didn't know. I called her back and informed her yes her breast cancer was Estrogen positive, that is why we have her on the Tamoxifen. It is an estrogen blocking agent to help prevent the cancer from coming back. She thanked me and verbalized understanding. Her new OB Gyn is a [facility] physician. I then stated they should be able to see her records in [facility] from Dr. Duong Norwood. That her Estrogen status is part of her breast cancer dx. She again thanked me and verbalized understanding.

## 2017-12-04 LAB — CYTOLOGY REPORT: NORMAL

## 2018-01-09 ENCOUNTER — HOSPITAL ENCOUNTER (OUTPATIENT)
Dept: ULTRASOUND IMAGING | Age: 53
Discharge: HOME | End: 2018-01-09
Payer: COMMERCIAL

## 2018-01-09 DIAGNOSIS — N93.9 ABNORMAL UTERINE BLEEDING (AUB): ICD-10-CM

## 2018-01-09 DIAGNOSIS — Z85.3 PERSONAL HISTORY OF BREAST CANCER: ICD-10-CM

## 2018-01-09 PROCEDURE — 76830 TRANSVAGINAL US NON-OB: CPT

## 2018-01-09 PROCEDURE — 76856 US EXAM PELVIC COMPLETE: CPT

## 2018-01-16 ENCOUNTER — OFFICE VISIT (OUTPATIENT)
Dept: OBGYN CLINIC | Age: 53
End: 2018-01-16
Payer: COMMERCIAL

## 2018-01-16 VITALS
BODY MASS INDEX: 26.87 KG/M2 | HEIGHT: 62 IN | DIASTOLIC BLOOD PRESSURE: 70 MMHG | RESPIRATION RATE: 20 BRPM | WEIGHT: 146 LBS | SYSTOLIC BLOOD PRESSURE: 122 MMHG | HEART RATE: 78 BPM

## 2018-01-16 DIAGNOSIS — Z79.810 CARE RELATED TO CURRENT TAMOXIFEN USE: Primary | ICD-10-CM

## 2018-01-16 DIAGNOSIS — Z92.29 HX OF TAMOXIFEN THERAPY: ICD-10-CM

## 2018-01-16 PROCEDURE — 99213 OFFICE O/P EST LOW 20 MIN: CPT | Performed by: OBSTETRICS & GYNECOLOGY

## 2018-01-16 NOTE — PROGRESS NOTES
PORT PLACEMENT      Dr Ezra Andres         Family History   Problem Relation Age of Onset    High Blood Pressure Mother     Allergies Mother     Heart Disease Father      bypass x5    High Blood Pressure Father     Diabetes Father     Allergies Brother          Social History   Substance Use Topics    Smoking status: Never Smoker    Smokeless tobacco: Never Used    Alcohol use 0.0 oz/week      Comment: rarely         MEDICATIONS:  Current Outpatient Prescriptions   Medication Sig Dispense Refill    MULTIPLE VITAMIN PO Take by mouth      Ascorbic Acid (VITAMIN C) 250 MG tablet Take 250 mg by mouth daily      oxybutynin (DITROPAN XL) 10 MG extended release tablet Take 1 tablet by mouth daily 30 tablet 3    triamterene-hydrochlorothiazide (DYAZIDE) 37.5-25 MG per capsule Take 1 capsule by mouth daily 90 capsule 3    tamoxifen (NOLVADEX) 20 MG tablet Take 1 tablet by mouth daily 90 tablet 3    aspirin-acetaminophen-caffeine (EXCEDRIN MIGRAINE) 250-250-65 MG per tablet Take 1 tablet by mouth every 6 hours as needed for Headaches      calcium carbonate (OYSTER SHELL CALCIUM 500 MG) 1250 MG tablet Take 1 tablet by mouth daily      polyethylene glycol (GLYCOLAX) powder Take 17 g by mouth daily. No current facility-administered medications for this visit. ALLERGIES:  Allergies as of 01/16/2018 - Review Complete 01/16/2018   Allergen Reaction Noted    Adhesive tape Other (See Comments) 03/31/2014    Codeine  07/12/2013         Blood pressure 122/70, pulse 78, resp. rate 20, height 5' 2\" (1.575 m), weight 146 lb (66.2 kg), last menstrual period 12/31/2017, not currently breastfeeding. Abdomen: Soft non-tender; good bowel sounds. No guarding, rebound or rigidity. No CVA tenderness bilaterally.     Extremities: No calf tenderness, DTR 2/4, and No edema bilaterally    Pelvic: Declined         Diagnostics:  Us Non Ob Transvaginal    Result Date: 1/9/2018  EXAMINATION: PELVIC ULTRASOUND 1/9/2018

## 2018-03-20 VITALS — WEIGHT: 140 LBS | BODY MASS INDEX: 25.61 KG/M2

## 2018-04-03 ENCOUNTER — OFFICE VISIT (OUTPATIENT)
Dept: FAMILY MEDICINE CLINIC | Age: 53
End: 2018-04-03
Payer: COMMERCIAL

## 2018-04-03 VITALS
TEMPERATURE: 98.7 F | RESPIRATION RATE: 16 BRPM | DIASTOLIC BLOOD PRESSURE: 70 MMHG | HEART RATE: 64 BPM | SYSTOLIC BLOOD PRESSURE: 110 MMHG

## 2018-04-03 DIAGNOSIS — J40 BRONCHITIS: Primary | ICD-10-CM

## 2018-04-03 DIAGNOSIS — R52 BODY ACHES: ICD-10-CM

## 2018-04-03 DIAGNOSIS — R09.89 CHEST CONGESTION: ICD-10-CM

## 2018-04-03 DIAGNOSIS — R05.9 COUGH: ICD-10-CM

## 2018-04-03 LAB
INFLUENZA A ANTIBODY: NEGATIVE
INFLUENZA B ANTIBODY: NEGATIVE

## 2018-04-03 PROCEDURE — 99213 OFFICE O/P EST LOW 20 MIN: CPT | Performed by: FAMILY MEDICINE

## 2018-04-03 PROCEDURE — 87804 INFLUENZA ASSAY W/OPTIC: CPT | Performed by: FAMILY MEDICINE

## 2018-04-03 RX ORDER — AZITHROMYCIN 250 MG/1
TABLET, FILM COATED ORAL
Qty: 1 PACKET | Refills: 1 | Status: SHIPPED | OUTPATIENT
Start: 2018-04-03 | End: 2018-04-13

## 2018-04-03 RX ORDER — GUAIFENESIN AND DEXTROMETHORPHAN HYDROBROMIDE 600; 30 MG/1; MG/1
TABLET, EXTENDED RELEASE ORAL
Qty: 28 TABLET | Refills: 1 | Status: SHIPPED | OUTPATIENT
Start: 2018-04-03 | End: 2018-04-24

## 2018-04-03 ASSESSMENT — ENCOUNTER SYMPTOMS
CHEST TIGHTNESS: 0
WHEEZING: 0
VOMITING: 0
ABDOMINAL PAIN: 0
SHORTNESS OF BREATH: 0
COUGH: 1

## 2018-04-18 ENCOUNTER — EMPLOYEE WELLNESS (OUTPATIENT)
Dept: OTHER | Age: 53
End: 2018-04-18

## 2018-04-18 LAB
CHOLESTEROL/HDL RATIO: 3.3
CHOLESTEROL: 160 MG/DL
GLUCOSE BLD-MCNC: 82 MG/DL (ref 70–99)
HDLC SERPL-MCNC: 48 MG/DL
LDL CHOLESTEROL: 89 MG/DL (ref 0–130)
PATIENT FASTING?: NORMAL
TRIGL SERPL-MCNC: 115 MG/DL
VLDLC SERPL CALC-MCNC: NORMAL MG/DL (ref 1–30)

## 2018-04-23 VITALS — WEIGHT: 149 LBS | BODY MASS INDEX: 27.25 KG/M2

## 2018-04-24 ENCOUNTER — OFFICE VISIT (OUTPATIENT)
Dept: ONCOLOGY | Age: 53
End: 2018-04-24
Payer: COMMERCIAL

## 2018-04-24 VITALS
RESPIRATION RATE: 16 BRPM | HEART RATE: 56 BPM | WEIGHT: 149.4 LBS | BODY MASS INDEX: 27.33 KG/M2 | DIASTOLIC BLOOD PRESSURE: 83 MMHG | TEMPERATURE: 98.2 F | SYSTOLIC BLOOD PRESSURE: 127 MMHG

## 2018-04-24 DIAGNOSIS — Z17.0 MALIGNANT NEOPLASM OF UPPER-OUTER QUADRANT OF BREAST IN FEMALE, ESTROGEN RECEPTOR POSITIVE, UNSPECIFIED LATERALITY (HCC): ICD-10-CM

## 2018-04-24 DIAGNOSIS — C50.419 MALIGNANT NEOPLASM OF UPPER-OUTER QUADRANT OF BREAST IN FEMALE, ESTROGEN RECEPTOR POSITIVE, UNSPECIFIED LATERALITY (HCC): ICD-10-CM

## 2018-04-24 PROCEDURE — 99214 OFFICE O/P EST MOD 30 MIN: CPT | Performed by: INTERNAL MEDICINE

## 2018-04-24 PROCEDURE — 99211 OFF/OP EST MAY X REQ PHY/QHP: CPT

## 2018-05-07 ENCOUNTER — TELEPHONE (OUTPATIENT)
Dept: ONCOLOGY | Age: 53
End: 2018-05-07

## 2018-05-10 ENCOUNTER — OFFICE VISIT (OUTPATIENT)
Dept: FAMILY MEDICINE CLINIC | Age: 53
End: 2018-05-10
Payer: COMMERCIAL

## 2018-05-10 VITALS
RESPIRATION RATE: 14 BRPM | DIASTOLIC BLOOD PRESSURE: 80 MMHG | TEMPERATURE: 98.4 F | HEART RATE: 70 BPM | SYSTOLIC BLOOD PRESSURE: 120 MMHG

## 2018-05-10 DIAGNOSIS — J30.2 SEASONAL ALLERGIC RHINITIS, UNSPECIFIED CHRONICITY, UNSPECIFIED TRIGGER: ICD-10-CM

## 2018-05-10 DIAGNOSIS — J20.9 BRONCHITIS WITH BRONCHOSPASM: Primary | ICD-10-CM

## 2018-05-10 PROCEDURE — 99213 OFFICE O/P EST LOW 20 MIN: CPT | Performed by: FAMILY MEDICINE

## 2018-05-10 RX ORDER — BENZONATATE 100 MG/1
CAPSULE ORAL
Qty: 30 CAPSULE | Refills: 1 | Status: SHIPPED | OUTPATIENT
Start: 2018-05-10 | End: 2018-09-20

## 2018-05-10 RX ORDER — LORATADINE 10 MG/1
10 TABLET ORAL DAILY
Qty: 30 TABLET | Refills: 3 | Status: SHIPPED | OUTPATIENT
Start: 2018-05-10 | End: 2020-11-06

## 2018-05-10 RX ORDER — FLUTICASONE PROPIONATE 50 MCG
SPRAY, SUSPENSION (ML) NASAL
Qty: 1 BOTTLE | Refills: 3 | Status: SHIPPED | OUTPATIENT
Start: 2018-05-10 | End: 2018-12-17 | Stop reason: SDUPTHER

## 2018-05-10 RX ORDER — SULFAMETHOXAZOLE AND TRIMETHOPRIM 800; 160 MG/1; MG/1
1 TABLET ORAL 2 TIMES DAILY
Qty: 28 TABLET | Refills: 0 | Status: SHIPPED | OUTPATIENT
Start: 2018-05-10 | End: 2018-05-21 | Stop reason: ALTCHOICE

## 2018-05-10 RX ORDER — ALBUTEROL SULFATE 90 UG/1
2 AEROSOL, METERED RESPIRATORY (INHALATION) EVERY 6 HOURS PRN
Qty: 1 INHALER | Refills: 1 | Status: SHIPPED | OUTPATIENT
Start: 2018-05-10 | End: 2018-09-20

## 2018-05-10 ASSESSMENT — ENCOUNTER SYMPTOMS
WHEEZING: 1
ABDOMINAL PAIN: 0
RHINORRHEA: 1
COUGH: 1
CHEST TIGHTNESS: 0

## 2018-05-14 ENCOUNTER — TELEPHONE (OUTPATIENT)
Dept: FAMILY MEDICINE CLINIC | Age: 53
End: 2018-05-14

## 2018-05-14 ENCOUNTER — HOSPITAL ENCOUNTER (OUTPATIENT)
Dept: GENERAL RADIOLOGY | Age: 53
Discharge: HOME | End: 2018-05-16
Payer: COMMERCIAL

## 2018-05-14 ENCOUNTER — HOSPITAL ENCOUNTER (OUTPATIENT)
Age: 53
Discharge: HOME | End: 2018-05-16
Payer: COMMERCIAL

## 2018-05-14 DIAGNOSIS — R06.2 WHEEZING: Primary | ICD-10-CM

## 2018-05-14 DIAGNOSIS — R05.9 COUGH: ICD-10-CM

## 2018-05-14 DIAGNOSIS — R06.2 WHEEZING: ICD-10-CM

## 2018-05-14 PROCEDURE — 71046 X-RAY EXAM CHEST 2 VIEWS: CPT

## 2018-05-14 RX ORDER — METHYLPREDNISOLONE 4 MG/1
TABLET ORAL
Qty: 1 KIT | Refills: 0 | Status: SHIPPED | OUTPATIENT
Start: 2018-05-14 | End: 2018-09-20

## 2018-05-14 RX ORDER — FLUCONAZOLE 150 MG/1
150 TABLET ORAL ONCE
Qty: 1 TABLET | Refills: 0 | Status: SHIPPED | OUTPATIENT
Start: 2018-05-14 | End: 2018-05-17 | Stop reason: SDUPTHER

## 2018-05-17 ENCOUNTER — TELEPHONE (OUTPATIENT)
Dept: ONCOLOGY | Age: 53
End: 2018-05-17

## 2018-05-17 ENCOUNTER — TELEPHONE (OUTPATIENT)
Dept: FAMILY MEDICINE CLINIC | Age: 53
End: 2018-05-17

## 2018-05-17 RX ORDER — FLUCONAZOLE 150 MG/1
150 TABLET ORAL ONCE
Qty: 1 TABLET | Refills: 0 | Status: SHIPPED | OUTPATIENT
Start: 2018-05-17 | End: 2018-05-17

## 2018-05-21 ENCOUNTER — TELEPHONE (OUTPATIENT)
Dept: FAMILY MEDICINE CLINIC | Age: 53
End: 2018-05-21

## 2018-05-21 ENCOUNTER — TELEPHONE (OUTPATIENT)
Dept: ONCOLOGY | Age: 53
End: 2018-05-21

## 2018-05-21 RX ORDER — DOXYCYCLINE HYCLATE 100 MG
100 TABLET ORAL 2 TIMES DAILY
Qty: 20 TABLET | Refills: 0 | Status: SHIPPED | OUTPATIENT
Start: 2018-05-21 | End: 2018-05-31

## 2018-05-29 ENCOUNTER — HOSPITAL ENCOUNTER (OUTPATIENT)
Dept: INFUSION THERAPY | Age: 53
End: 2018-05-29
Payer: COMMERCIAL

## 2018-05-29 ENCOUNTER — TELEPHONE (OUTPATIENT)
Dept: ONCOLOGY | Age: 53
End: 2018-05-29

## 2018-07-24 RX ORDER — TAMOXIFEN CITRATE 20 MG/1
20 TABLET ORAL DAILY
Qty: 90 TABLET | Refills: 3 | Status: SHIPPED | OUTPATIENT
Start: 2018-07-24 | End: 2020-11-06 | Stop reason: ALTCHOICE

## 2018-09-06 ENCOUNTER — TELEPHONE (OUTPATIENT)
Dept: ONCOLOGY | Age: 53
End: 2018-09-06

## 2018-09-20 ENCOUNTER — OFFICE VISIT (OUTPATIENT)
Dept: FAMILY MEDICINE CLINIC | Age: 53
End: 2018-09-20
Payer: COMMERCIAL

## 2018-09-20 VITALS
HEART RATE: 60 BPM | SYSTOLIC BLOOD PRESSURE: 130 MMHG | BODY MASS INDEX: 26.67 KG/M2 | DIASTOLIC BLOOD PRESSURE: 86 MMHG | WEIGHT: 145.8 LBS | RESPIRATION RATE: 14 BRPM

## 2018-09-20 DIAGNOSIS — N39.3 SUI (STRESS URINARY INCONTINENCE, FEMALE): ICD-10-CM

## 2018-09-20 DIAGNOSIS — Z00.00 ANNUAL PHYSICAL EXAM: Primary | ICD-10-CM

## 2018-09-20 PROCEDURE — 99396 PREV VISIT EST AGE 40-64: CPT | Performed by: FAMILY MEDICINE

## 2018-09-20 ASSESSMENT — ENCOUNTER SYMPTOMS
BLOOD IN STOOL: 0
CHEST TIGHTNESS: 0
RHINORRHEA: 0
WHEEZING: 0
SHORTNESS OF BREATH: 0
ABDOMINAL PAIN: 0
SORE THROAT: 0
COUGH: 0

## 2018-09-20 ASSESSMENT — PATIENT HEALTH QUESTIONNAIRE - PHQ9
SUM OF ALL RESPONSES TO PHQ9 QUESTIONS 1 & 2: 0
2. FEELING DOWN, DEPRESSED OR HOPELESS: 0
SUM OF ALL RESPONSES TO PHQ QUESTIONS 1-9: 0
1. LITTLE INTEREST OR PLEASURE IN DOING THINGS: 0
SUM OF ALL RESPONSES TO PHQ QUESTIONS 1-9: 0

## 2018-09-20 NOTE — PROGRESS NOTES
Subjective:      Patient ID: Tawny Russell is a 48 y.o. female. Visit Information    Have you changed or started any medications since your last visit including any over-the-counter medicines, vitamins, or herbal medicines? no   Are you having any side effects from any of your medications? -  no  Have you stopped taking any of your medications? Is so, why? -  no    Have you seen any other physician or provider since your last visit? No  Have you had any other diagnostic tests since your last visit? No  Have you been seen in the emergency room and/or had an admission to a hospital since we last saw you? No  Have you had your routine dental cleaning in the past 6 months? no    Have you activated your [employer] account? If not, what are your barriers? Yes     Patient Care Team:  Radha Blanco MD as PCP - Angie Krause MD as PCP - [facility] Attributed Provider  Daniel Simons DPM as Consulting Physician  Christiana Walter MD as Consulting Physician (Hematology and Oncology)  Douglas Fink DO as Consulting Physician (Obstetrics & Gynecology)  Lalit Reinoso MD as Consulting Physician (Gastroenterology)  Sebastien Santacruz MD as Surgeon (General Surgery)    Medical History Review  Past Medical, Family, and Social History reviewed and does contribute to the patient presenting condition    Health Maintenance   Topic Date Due    HIV screen  07/11/1982    Shingles Vaccine (1 of 2 - 2 Dose Series) 07/11/2017    DTaP/Tdap/Td vaccine (2 - Td) 07/20/2017    Flu vaccine (1) 09/01/2018    Cervical cancer screen  11/28/2022    Lipid screen  04/18/2023    Colon cancer screen colonoscopy  11/18/2027     HPI  Patient is a 53-year-old white female who presents for her annual physical exam. Patient states she is taking and tolerating her routine medication. She also states for the past 2 years she has had urinary incontinence especially when she coughs or sneezes.  She denies any dysuria, hematuria, referred to urology for her urinary incontinence  Continue routine medication  Follow-up in 6 months

## 2018-11-29 ENCOUNTER — HOSPITAL ENCOUNTER (OUTPATIENT)
Dept: MRI IMAGING | Age: 53
Discharge: HOME | End: 2018-12-01
Payer: COMMERCIAL

## 2018-11-29 DIAGNOSIS — Z17.0 MALIGNANT NEOPLASM OF UPPER-OUTER QUADRANT OF BREAST IN FEMALE, ESTROGEN RECEPTOR POSITIVE, UNSPECIFIED LATERALITY (HCC): ICD-10-CM

## 2018-11-29 DIAGNOSIS — C50.419 MALIGNANT NEOPLASM OF UPPER-OUTER QUADRANT OF BREAST IN FEMALE, ESTROGEN RECEPTOR POSITIVE, UNSPECIFIED LATERALITY (HCC): ICD-10-CM

## 2018-11-29 LAB
BUN BLDV-MCNC: 18 MG/DL (ref 6–20)
CREAT SERPL-MCNC: 0.97 MG/DL (ref 0.5–0.9)
GFR AFRICAN AMERICAN: >60 ML/MIN
GFR NON-AFRICAN AMERICAN: >60 ML/MIN
GFR SERPL CREATININE-BSD FRML MDRD: ABNORMAL ML/MIN/{1.73_M2}
GFR SERPL CREATININE-BSD FRML MDRD: ABNORMAL ML/MIN/{1.73_M2}

## 2018-11-29 PROCEDURE — 2580000003 HC RX 258: Performed by: INTERNAL MEDICINE

## 2018-11-29 PROCEDURE — A9579 GAD-BASE MR CONTRAST NOS,1ML: HCPCS | Performed by: INTERNAL MEDICINE

## 2018-11-29 PROCEDURE — C8908 MRI W/O FOL W/CONT, BREAST,: HCPCS

## 2018-11-29 PROCEDURE — 36415 COLL VENOUS BLD VENIPUNCTURE: CPT

## 2018-11-29 PROCEDURE — 82565 ASSAY OF CREATININE: CPT

## 2018-11-29 PROCEDURE — 84520 ASSAY OF UREA NITROGEN: CPT

## 2018-11-29 PROCEDURE — 6360000004 HC RX CONTRAST MEDICATION: Performed by: INTERNAL MEDICINE

## 2018-11-29 RX ORDER — SODIUM CHLORIDE 0.9 % (FLUSH) 0.9 %
10 SYRINGE (ML) INJECTION PRN
Status: DISCONTINUED | OUTPATIENT
Start: 2018-11-29 | End: 2018-12-02 | Stop reason: HOSPADM

## 2018-11-29 RX ORDER — 0.9 % SODIUM CHLORIDE 0.9 %
45 INTRAVENOUS SOLUTION INTRAVENOUS ONCE
Status: COMPLETED | OUTPATIENT
Start: 2018-11-29 | End: 2018-11-29

## 2018-11-29 RX ADMIN — Medication 10 ML: at 18:18

## 2018-11-29 RX ADMIN — SODIUM CHLORIDE 45 ML: 9 INJECTION, SOLUTION INTRAVENOUS at 18:18

## 2018-11-29 RX ADMIN — GADOTERIDOL 14 ML: 279.3 INJECTION, SOLUTION INTRAVENOUS at 18:18

## 2018-12-07 ENCOUNTER — TELEPHONE (OUTPATIENT)
Dept: FAMILY MEDICINE CLINIC | Age: 53
End: 2018-12-07

## 2018-12-07 RX ORDER — FLUCONAZOLE 150 MG/1
150 TABLET ORAL ONCE
Qty: 1 TABLET | Refills: 0 | Status: SHIPPED | OUTPATIENT
Start: 2018-12-07 | End: 2018-12-07

## 2018-12-07 NOTE — TELEPHONE ENCOUNTER
The patient has developed a yeast infection last Wednesday and she has been using Monistat with no relief. She wondered if something else could be sent to [facility] in [facility]. Please advise.

## 2018-12-11 ENCOUNTER — HOSPITAL ENCOUNTER (OUTPATIENT)
Age: 53
Setting detail: SPECIMEN
Discharge: HOME | End: 2018-12-11
Payer: COMMERCIAL

## 2018-12-11 ENCOUNTER — OFFICE VISIT (OUTPATIENT)
Dept: OBGYN CLINIC | Age: 53
End: 2018-12-11
Payer: COMMERCIAL

## 2018-12-11 ENCOUNTER — TELEPHONE (OUTPATIENT)
Dept: OBGYN CLINIC | Age: 53
End: 2018-12-11

## 2018-12-11 VITALS
WEIGHT: 153 LBS | BODY MASS INDEX: 28.16 KG/M2 | SYSTOLIC BLOOD PRESSURE: 133 MMHG | HEART RATE: 90 BPM | HEIGHT: 62 IN | DIASTOLIC BLOOD PRESSURE: 86 MMHG

## 2018-12-11 DIAGNOSIS — B37.31 YEAST INFECTION OF THE VAGINA: ICD-10-CM

## 2018-12-11 DIAGNOSIS — Z01.419 WOMEN'S ANNUAL ROUTINE GYNECOLOGICAL EXAMINATION: Primary | ICD-10-CM

## 2018-12-11 PROCEDURE — 99396 PREV VISIT EST AGE 40-64: CPT | Performed by: OBSTETRICS & GYNECOLOGY

## 2018-12-11 ASSESSMENT — ENCOUNTER SYMPTOMS
CONSTIPATION: 0
COUGH: 0
ABDOMINAL PAIN: 0
SHORTNESS OF BREATH: 0
BACK PAIN: 0

## 2018-12-11 NOTE — PROGRESS NOTES
Allergies Brother        Review of Systems:   Review of Systems   Constitutional: Negative for chills and fatigue. HENT: Negative for congestion. Respiratory: Negative for cough and shortness of breath. Cardiovascular: Negative for chest pain and palpitations. Gastrointestinal: Negative for abdominal pain and constipation. Endocrine: Negative for cold intolerance. Musculoskeletal: Negative for back pain. Psychiatric/Behavioral: The patient is not nervous/anxious. Physical exam:  vitals:  Height   5  ft    2 in,  Weight    153 lbs,   133/86 BP  Gen: alert, no apparent distress  HEENT:No pathologic skin lesions noted,NC/AT,PERRL, normal midline nontender thyroid   Lung Exam: Clear to auscultation in all fields bilaterally, without wheezes,rales or rhonchi. Cardiac Exam: Normal sinus rhythm andrate, without murmurs, rubs or gallops appreciated. Breast Exam: Symmetric without pathological skin changes, nontender without discrete suspicious masses palpated, supraclavicular or axillary adenopathy or nipple discharge noted. Bilateral mastectomy. Abdominal Exam: Nontender to deep palpation without organomegaly, masses or CVAT appreciated, BS positive. No spinal deformation or tenderness. External Genitalia: Normal development without vulvar,vaginal or cervical lesions noted. Normal vaginal discharge, uterus anterior, 4-6 weeks without CMT. Adnexa nontender without abnormal masses bilaterally. Rectal Exam: Omitted. Extremities: Nontender withoutclubbing, cyanosis or edema. F.R.O.M. Neurologic Exam: Grossly intact without noted sensorimotor deficits and oriented x 3.     TVUS 1/9/18:  FINDINGS:   Measurements:       Patient's LMP is reported be 1.5 weeks ago.       Uterus:  8.7 x 4.6 x 5.9 cm for a volume of 124.3 mL.       Endometrial stripe:  Up to 8 mm.       Right Ovary:  2.7 x 2.2 x 2.3 cm for a volume of 7.1 mL.       Left Ovary:  1.8 x 1.3 x 2.7 cm for a volume of 3.3 mL.

## 2018-12-17 RX ORDER — FLUTICASONE PROPIONATE 50 MCG
SPRAY, SUSPENSION (ML) NASAL
Qty: 1 BOTTLE | Refills: 1 | Status: SHIPPED | OUTPATIENT
Start: 2018-12-17 | End: 2020-03-02

## 2018-12-28 LAB — CYTOLOGY REPORT: NORMAL

## 2019-01-03 ENCOUNTER — OFFICE VISIT (OUTPATIENT)
Dept: ONCOLOGY | Age: 54
End: 2019-01-03
Payer: COMMERCIAL

## 2019-01-03 VITALS
HEART RATE: 62 BPM | DIASTOLIC BLOOD PRESSURE: 88 MMHG | SYSTOLIC BLOOD PRESSURE: 139 MMHG | WEIGHT: 154.44 LBS | TEMPERATURE: 98.3 F | BODY MASS INDEX: 28.25 KG/M2

## 2019-01-03 DIAGNOSIS — N39.3 SUI (STRESS URINARY INCONTINENCE, FEMALE): ICD-10-CM

## 2019-01-03 DIAGNOSIS — C50.419 MALIGNANT NEOPLASM OF UPPER-OUTER QUADRANT OF BREAST IN FEMALE, ESTROGEN RECEPTOR POSITIVE, UNSPECIFIED LATERALITY (HCC): Primary | ICD-10-CM

## 2019-01-03 DIAGNOSIS — D05.12 DUCTAL CARCINOMA IN SITU OF LEFT BREAST: ICD-10-CM

## 2019-01-03 DIAGNOSIS — Z17.0 MALIGNANT NEOPLASM OF UPPER-OUTER QUADRANT OF BREAST IN FEMALE, ESTROGEN RECEPTOR POSITIVE, UNSPECIFIED LATERALITY (HCC): Primary | ICD-10-CM

## 2019-01-03 PROCEDURE — 99214 OFFICE O/P EST MOD 30 MIN: CPT | Performed by: INTERNAL MEDICINE

## 2019-01-03 PROCEDURE — 99211 OFF/OP EST MAY X REQ PHY/QHP: CPT | Performed by: INTERNAL MEDICINE

## 2019-01-04 ENCOUNTER — TELEPHONE (OUTPATIENT)
Dept: ONCOLOGY | Age: 54
End: 2019-01-04

## 2019-01-07 ENCOUNTER — TELEPHONE (OUTPATIENT)
Dept: ONCOLOGY | Age: 54
End: 2019-01-07

## 2019-01-11 ENCOUNTER — TELEPHONE (OUTPATIENT)
Dept: OBGYN CLINIC | Age: 54
End: 2019-01-11

## 2019-01-14 ENCOUNTER — OFFICE VISIT (OUTPATIENT)
Dept: UROLOGY | Age: 54
End: 2019-01-14
Payer: COMMERCIAL

## 2019-01-14 VITALS
HEIGHT: 62 IN | BODY MASS INDEX: 28.34 KG/M2 | HEART RATE: 68 BPM | DIASTOLIC BLOOD PRESSURE: 83 MMHG | TEMPERATURE: 98.5 F | WEIGHT: 154 LBS | SYSTOLIC BLOOD PRESSURE: 127 MMHG

## 2019-01-14 DIAGNOSIS — R39.15 URGENCY OF URINATION: ICD-10-CM

## 2019-01-14 DIAGNOSIS — N94.10 DYSPAREUNIA, FEMALE: ICD-10-CM

## 2019-01-14 DIAGNOSIS — N39.3 SUI (STRESS URINARY INCONTINENCE, FEMALE): Primary | ICD-10-CM

## 2019-01-14 DIAGNOSIS — N39.3 STRESS INCONTINENCE: Primary | ICD-10-CM

## 2019-01-14 PROCEDURE — 99204 OFFICE O/P NEW MOD 45 MIN: CPT | Performed by: UROLOGY

## 2019-01-14 ASSESSMENT — ENCOUNTER SYMPTOMS
WHEEZING: 0
COUGH: 0
BACK PAIN: 0
NAUSEA: 0
EYE PAIN: 0
EYE REDNESS: 0
ABDOMINAL PAIN: 1
VOMITING: 0
COLOR CHANGE: 0
SHORTNESS OF BREATH: 0

## 2019-01-22 ENCOUNTER — HOSPITAL ENCOUNTER (OUTPATIENT)
Dept: WOMENS IMAGING | Age: 54
Discharge: HOME | End: 2019-01-24
Payer: COMMERCIAL

## 2019-01-22 DIAGNOSIS — D05.12 DUCTAL CARCINOMA IN SITU OF LEFT BREAST: ICD-10-CM

## 2019-01-22 DIAGNOSIS — Z17.0 MALIGNANT NEOPLASM OF UPPER-OUTER QUADRANT OF BREAST IN FEMALE, ESTROGEN RECEPTOR POSITIVE, UNSPECIFIED LATERALITY (HCC): ICD-10-CM

## 2019-01-22 DIAGNOSIS — C50.419 MALIGNANT NEOPLASM OF UPPER-OUTER QUADRANT OF BREAST IN FEMALE, ESTROGEN RECEPTOR POSITIVE, UNSPECIFIED LATERALITY (HCC): ICD-10-CM

## 2019-01-22 PROCEDURE — 77080 DXA BONE DENSITY AXIAL: CPT

## 2019-01-23 ENCOUNTER — HOSPITAL ENCOUNTER (OUTPATIENT)
Dept: PHYSICAL THERAPY | Facility: CLINIC | Age: 54
Setting detail: THERAPIES SERIES
Discharge: HOME | End: 2019-01-23
Payer: COMMERCIAL

## 2019-01-23 PROCEDURE — 97161 PT EVAL LOW COMPLEX 20 MIN: CPT

## 2019-01-23 PROCEDURE — 97530 THERAPEUTIC ACTIVITIES: CPT

## 2019-01-23 PROCEDURE — 97110 THERAPEUTIC EXERCISES: CPT

## 2019-01-30 ENCOUNTER — APPOINTMENT (OUTPATIENT)
Dept: PHYSICAL THERAPY | Facility: CLINIC | Age: 54
End: 2019-01-30
Payer: COMMERCIAL

## 2019-01-31 ENCOUNTER — HOSPITAL ENCOUNTER (EMERGENCY)
Age: 54
Discharge: HOME | End: 2019-01-31
Attending: EMERGENCY MEDICINE
Payer: COMMERCIAL

## 2019-01-31 ENCOUNTER — APPOINTMENT (OUTPATIENT)
Dept: CT IMAGING | Age: 54
End: 2019-01-31
Payer: COMMERCIAL

## 2019-01-31 ENCOUNTER — APPOINTMENT (OUTPATIENT)
Dept: GENERAL RADIOLOGY | Age: 54
End: 2019-01-31
Payer: COMMERCIAL

## 2019-01-31 VITALS
SYSTOLIC BLOOD PRESSURE: 132 MMHG | TEMPERATURE: 97.7 F | RESPIRATION RATE: 14 BRPM | OXYGEN SATURATION: 96 % | HEART RATE: 65 BPM | DIASTOLIC BLOOD PRESSURE: 76 MMHG

## 2019-01-31 DIAGNOSIS — R07.9 CHEST PAIN, UNSPECIFIED TYPE: Primary | ICD-10-CM

## 2019-01-31 LAB
ABSOLUTE EOS #: <0.03 K/UL (ref 0–0.44)
ABSOLUTE IMMATURE GRANULOCYTE: <0.03 K/UL (ref 0–0.3)
ABSOLUTE LYMPH #: 2.43 K/UL (ref 1.1–3.7)
ABSOLUTE MONO #: 0.52 K/UL (ref 0.1–1.2)
ANION GAP SERPL CALCULATED.3IONS-SCNC: 11 MMOL/L (ref 9–17)
BASOPHILS # BLD: 1 % (ref 0–2)
BASOPHILS ABSOLUTE: 0.05 K/UL (ref 0–0.2)
BUN BLDV-MCNC: 12 MG/DL (ref 6–20)
BUN/CREAT BLD: NORMAL (ref 9–20)
CALCIUM SERPL-MCNC: 9.7 MG/DL (ref 8.6–10.4)
CHLORIDE BLD-SCNC: 103 MMOL/L (ref 98–107)
CO2: 28 MMOL/L (ref 20–31)
CREAT SERPL-MCNC: 0.62 MG/DL (ref 0.5–0.9)
D-DIMER QUANTITATIVE: 0.53 MG/L FEU
DIFFERENTIAL TYPE: ABNORMAL
EKG ATRIAL RATE: 70 BPM
EKG P AXIS: 72 DEGREES
EKG P-R INTERVAL: 140 MS
EKG Q-T INTERVAL: 402 MS
EKG QRS DURATION: 72 MS
EKG QTC CALCULATION (BAZETT): 434 MS
EKG R AXIS: 62 DEGREES
EKG T AXIS: 49 DEGREES
EKG VENTRICULAR RATE: 70 BPM
EOSINOPHILS RELATIVE PERCENT: 0 % (ref 1–4)
GFR AFRICAN AMERICAN: >60 ML/MIN
GFR NON-AFRICAN AMERICAN: >60 ML/MIN
GFR SERPL CREATININE-BSD FRML MDRD: NORMAL ML/MIN/{1.73_M2}
GFR SERPL CREATININE-BSD FRML MDRD: NORMAL ML/MIN/{1.73_M2}
GLUCOSE BLD-MCNC: 80 MG/DL (ref 70–99)
HCT VFR BLD CALC: 43.2 % (ref 36.3–47.1)
HEMOGLOBIN: 15 G/DL (ref 11.9–15.1)
IMMATURE GRANULOCYTES: 0 %
LYMPHOCYTES # BLD: 34 % (ref 24–43)
MCH RBC QN AUTO: 30.2 PG (ref 25.2–33.5)
MCHC RBC AUTO-ENTMCNC: 34.7 G/DL (ref 28.4–34.8)
MCV RBC AUTO: 87.1 FL (ref 82.6–102.9)
MONOCYTES # BLD: 7 % (ref 3–12)
NRBC AUTOMATED: 0 PER 100 WBC
PDW BLD-RTO: 12.4 % (ref 11.8–14.4)
PLATELET # BLD: 388 K/UL (ref 138–453)
PLATELET ESTIMATE: ABNORMAL
PMV BLD AUTO: 9.2 FL (ref 8.1–13.5)
POTASSIUM SERPL-SCNC: 3.9 MMOL/L (ref 3.7–5.3)
RBC # BLD: 4.96 M/UL (ref 3.95–5.11)
RBC # BLD: ABNORMAL 10*6/UL
SEG NEUTROPHILS: 58 % (ref 36–65)
SEGMENTED NEUTROPHILS ABSOLUTE COUNT: 4.07 K/UL (ref 1.5–8.1)
SODIUM BLD-SCNC: 142 MMOL/L (ref 135–144)
TROPONIN INTERP: NORMAL
TROPONIN INTERP: NORMAL
TROPONIN T: NORMAL NG/ML
TROPONIN T: NORMAL NG/ML
TROPONIN, HIGH SENSITIVITY: <6 NG/L (ref 0–14)
TROPONIN, HIGH SENSITIVITY: <6 NG/L (ref 0–14)
WBC # BLD: 7.1 K/UL (ref 3.5–11.3)
WBC # BLD: ABNORMAL 10*3/UL

## 2019-01-31 PROCEDURE — 99285 EMERGENCY DEPT VISIT HI MDM: CPT

## 2019-01-31 PROCEDURE — 6360000004 HC RX CONTRAST MEDICATION: Performed by: EMERGENCY MEDICINE

## 2019-01-31 PROCEDURE — 71260 CT THORAX DX C+: CPT

## 2019-01-31 PROCEDURE — 85025 COMPLETE CBC W/AUTO DIFF WBC: CPT

## 2019-01-31 PROCEDURE — 6370000000 HC RX 637 (ALT 250 FOR IP): Performed by: EMERGENCY MEDICINE

## 2019-01-31 PROCEDURE — 93005 ELECTROCARDIOGRAM TRACING: CPT

## 2019-01-31 PROCEDURE — 84484 ASSAY OF TROPONIN QUANT: CPT

## 2019-01-31 PROCEDURE — 2580000003 HC RX 258: Performed by: EMERGENCY MEDICINE

## 2019-01-31 PROCEDURE — 80048 BASIC METABOLIC PNL TOTAL CA: CPT

## 2019-01-31 PROCEDURE — 71046 X-RAY EXAM CHEST 2 VIEWS: CPT

## 2019-01-31 PROCEDURE — 85379 FIBRIN DEGRADATION QUANT: CPT

## 2019-01-31 RX ORDER — 0.9 % SODIUM CHLORIDE 0.9 %
1000 INTRAVENOUS SOLUTION INTRAVENOUS ONCE
Status: COMPLETED | OUTPATIENT
Start: 2019-01-31 | End: 2019-01-31

## 2019-01-31 RX ORDER — ASPIRIN 81 MG/1
324 TABLET, CHEWABLE ORAL ONCE
Status: COMPLETED | OUTPATIENT
Start: 2019-01-31 | End: 2019-01-31

## 2019-01-31 RX ADMIN — SODIUM CHLORIDE 1000 ML: 9 INJECTION, SOLUTION INTRAVENOUS at 15:41

## 2019-01-31 RX ADMIN — ASPIRIN 81 MG 324 MG: 81 TABLET ORAL at 15:40

## 2019-01-31 RX ADMIN — IOVERSOL 75 ML: 741 INJECTION INTRA-ARTERIAL; INTRAVENOUS at 17:04

## 2019-01-31 ASSESSMENT — ENCOUNTER SYMPTOMS
VOMITING: 0
BACK PAIN: 0
BLOOD IN STOOL: 0
SORE THROAT: 0
CONSTIPATION: 0
RHINORRHEA: 0
ABDOMINAL PAIN: 0
CHEST TIGHTNESS: 0
DIARRHEA: 0
NAUSEA: 0
SHORTNESS OF BREATH: 1

## 2019-01-31 ASSESSMENT — HEART SCORE: ECG: 0

## 2019-02-06 ENCOUNTER — APPOINTMENT (OUTPATIENT)
Dept: PHYSICAL THERAPY | Facility: CLINIC | Age: 54
End: 2019-02-06
Payer: COMMERCIAL

## 2019-02-07 ENCOUNTER — HOSPITAL ENCOUNTER (OUTPATIENT)
Dept: PHYSICAL THERAPY | Facility: CLINIC | Age: 54
Setting detail: THERAPIES SERIES
Discharge: HOME | End: 2019-02-07
Payer: COMMERCIAL

## 2019-02-07 PROCEDURE — 97112 NEUROMUSCULAR REEDUCATION: CPT

## 2019-02-07 PROCEDURE — 97110 THERAPEUTIC EXERCISES: CPT

## 2019-02-20 ENCOUNTER — TELEPHONE (OUTPATIENT)
Dept: OBGYN CLINIC | Age: 54
End: 2019-02-20

## 2019-02-22 ENCOUNTER — OFFICE VISIT (OUTPATIENT)
Dept: OBGYN CLINIC | Age: 54
End: 2019-02-22
Payer: COMMERCIAL

## 2019-02-22 ENCOUNTER — HOSPITAL ENCOUNTER (OUTPATIENT)
Age: 54
Setting detail: SPECIMEN
Discharge: HOME | End: 2019-02-22
Payer: COMMERCIAL

## 2019-02-22 ENCOUNTER — TELEPHONE (OUTPATIENT)
Dept: OBGYN CLINIC | Age: 54
End: 2019-02-22

## 2019-02-22 VITALS
WEIGHT: 142 LBS | HEIGHT: 62 IN | DIASTOLIC BLOOD PRESSURE: 76 MMHG | BODY MASS INDEX: 26.13 KG/M2 | SYSTOLIC BLOOD PRESSURE: 128 MMHG | HEART RATE: 74 BPM

## 2019-02-22 DIAGNOSIS — Z79.810 LONG-TERM CURRENT USE OF TAMOXIFEN: ICD-10-CM

## 2019-02-22 DIAGNOSIS — N93.9 ABNORMAL UTERINE BLEEDING (AUB): Primary | ICD-10-CM

## 2019-02-22 PROCEDURE — 58100 BIOPSY OF UTERUS LINING: CPT | Performed by: OBSTETRICS & GYNECOLOGY

## 2019-02-22 ASSESSMENT — ENCOUNTER SYMPTOMS
SHORTNESS OF BREATH: 0
BACK PAIN: 0
COUGH: 0
ABDOMINAL PAIN: 0

## 2019-02-26 LAB — SURGICAL PATHOLOGY REPORT: NORMAL

## 2019-02-27 ENCOUNTER — HOSPITAL ENCOUNTER (OUTPATIENT)
Dept: PHYSICAL THERAPY | Facility: CLINIC | Age: 54
Setting detail: THERAPIES SERIES
Discharge: HOME | End: 2019-02-27
Payer: COMMERCIAL

## 2019-02-27 PROCEDURE — 97110 THERAPEUTIC EXERCISES: CPT

## 2019-02-27 PROCEDURE — 97112 NEUROMUSCULAR REEDUCATION: CPT

## 2019-03-14 ENCOUNTER — OFFICE VISIT (OUTPATIENT)
Dept: ONCOLOGY | Age: 54
End: 2019-03-14
Payer: COMMERCIAL

## 2019-03-14 VITALS
TEMPERATURE: 97.4 F | WEIGHT: 159.6 LBS | BODY MASS INDEX: 29.19 KG/M2 | SYSTOLIC BLOOD PRESSURE: 123 MMHG | DIASTOLIC BLOOD PRESSURE: 86 MMHG | HEART RATE: 64 BPM

## 2019-03-14 DIAGNOSIS — Z17.0 MALIGNANT NEOPLASM OF UPPER-OUTER QUADRANT OF BREAST IN FEMALE, ESTROGEN RECEPTOR POSITIVE, UNSPECIFIED LATERALITY (HCC): ICD-10-CM

## 2019-03-14 DIAGNOSIS — C50.419 MALIGNANT NEOPLASM OF UPPER-OUTER QUADRANT OF BREAST IN FEMALE, ESTROGEN RECEPTOR POSITIVE, UNSPECIFIED LATERALITY (HCC): ICD-10-CM

## 2019-03-14 PROCEDURE — 99214 OFFICE O/P EST MOD 30 MIN: CPT | Performed by: INTERNAL MEDICINE

## 2019-03-14 PROCEDURE — 99211 OFF/OP EST MAY X REQ PHY/QHP: CPT | Performed by: INTERNAL MEDICINE

## 2019-03-20 ENCOUNTER — HOSPITAL ENCOUNTER (OUTPATIENT)
Dept: PHYSICAL THERAPY | Facility: CLINIC | Age: 54
Setting detail: THERAPIES SERIES
Discharge: HOME | End: 2019-03-20
Payer: COMMERCIAL

## 2019-03-20 PROCEDURE — 97110 THERAPEUTIC EXERCISES: CPT

## 2019-03-20 PROCEDURE — 97112 NEUROMUSCULAR REEDUCATION: CPT

## 2019-03-28 ENCOUNTER — TELEPHONE (OUTPATIENT)
Dept: INTERNAL MEDICINE | Age: 54
End: 2019-03-28

## 2019-04-15 ENCOUNTER — OFFICE VISIT (OUTPATIENT)
Dept: UROLOGY | Age: 54
End: 2019-04-15
Payer: COMMERCIAL

## 2019-04-15 VITALS
OXYGEN SATURATION: 97 % | DIASTOLIC BLOOD PRESSURE: 82 MMHG | BODY MASS INDEX: 30 KG/M2 | SYSTOLIC BLOOD PRESSURE: 138 MMHG | HEART RATE: 77 BPM | WEIGHT: 163 LBS | TEMPERATURE: 98.1 F | HEIGHT: 62 IN

## 2019-04-15 DIAGNOSIS — N94.10 DYSPAREUNIA, FEMALE: ICD-10-CM

## 2019-04-15 DIAGNOSIS — R35.0 FREQUENCY OF URINATION: ICD-10-CM

## 2019-04-15 DIAGNOSIS — R39.15 URGENCY OF URINATION: ICD-10-CM

## 2019-04-15 DIAGNOSIS — N39.3 SUI (STRESS URINARY INCONTINENCE, FEMALE): Primary | ICD-10-CM

## 2019-04-15 PROCEDURE — 99214 OFFICE O/P EST MOD 30 MIN: CPT | Performed by: UROLOGY

## 2019-04-15 RX ORDER — MIRABEGRON 50 MG/1
50 TABLET, FILM COATED, EXTENDED RELEASE ORAL DAILY
Qty: 30 TABLET | Refills: 11 | Status: SHIPPED | OUTPATIENT
Start: 2019-04-15 | End: 2020-11-06

## 2019-04-15 ASSESSMENT — ENCOUNTER SYMPTOMS
SHORTNESS OF BREATH: 0
CONSTIPATION: 0
BACK PAIN: 0
VOMITING: 0
EYE REDNESS: 0
DIARRHEA: 0
COUGH: 0
EYE PAIN: 0
WHEEZING: 0
ABDOMINAL PAIN: 1
NAUSEA: 0

## 2019-04-15 NOTE — PROGRESS NOTES
[facility]  [facility] [place]  [address]  [address]  [address] [facility] [address]  Dept: [address] [facility] Urology Office Note - Established    Patient:  Marycarmen Khan  YOB: 1965  Date: 4/15/2019    The patient is a 48 y.o. female whopresents today for evaluation of the following problems:   Chief Complaint   Patient presents with    Follow-up     3 month        HPI  Urinary Incontinence:  Patient is here today for urinary incontinence which started a few year(s) ago. Recently the urinary incontinence symptoms: are improving  Current medical Rx for incontinence: none  Stress incontinence: Severity = moderate. Urge Incontinence:  Severity = moderate. Number of pads per day: 2  Frequency: 1 hour      Summary of old records: N/A    Additional History: N/A    Procedures Today: N/A    Urinalysis today:  No results found for this visit on 04/15/19.     Imaging Reviewed during this Office Visit: none  (results were independently reviewed by physician and radiology report verified)    AUA Symptom Score (4/15/2019):  INCOMPLETE EMPTYING: How often have you had the sensation of not emptying your bladder?: Less than Half the time  FREQUENCY: How often do you have to urinate less than every two hours?: Less than 1 to 5 times  INTERMITTENCY: How often have you found you stopped and started again several times when you urinated?: Not at all  URGENCY: How often have you found it difficult to postpone urination?: Not at all  WEAK STREAM: How often have you had a weak urinary stream?: Not at all  STRAINING: How often have you had to strain to start  urination?: Not at all  @(5505)@  TOTAL I-PSS SCORE[de-identified] 5  How would you feel if you were to spend the rest of your life with your urinary condition?: Unhappy    Last BUN and creatinine:  Lab Results   Component Value Date    BUN 12 01/31/2019     Lab Results   Component Value Date    CREATININE 0.62 01/31/2019       Additional Lab/Culture

## 2019-04-15 NOTE — PROGRESS NOTES
Review of Systems   Constitutional: Negative for appetite change, chills and fever. Eyes: Negative for pain, redness and visual disturbance. Respiratory: Negative for cough, shortness of breath and wheezing. Cardiovascular: Negative for chest pain and leg swelling. Gastrointestinal: Positive for abdominal pain. Negative for constipation, diarrhea, nausea and vomiting. Genitourinary: Negative for difficulty urinating, dysuria, flank pain, frequency, hematuria and urgency. Musculoskeletal: Negative for back pain, joint swelling and myalgias. Skin: Negative for rash and wound. Neurological: Negative for dizziness, tremors and numbness. Hematological: Does not bruise/bleed easily.

## 2019-04-16 ENCOUNTER — TELEPHONE (OUTPATIENT)
Dept: PHARMACY | Age: 54
End: 2019-04-16

## 2019-04-16 NOTE — TELEPHONE ENCOUNTER
Medication Management Service    Date of Chart Review: 4/16/2019  Patient's Name: Brady Mcwilliams YOB: 1965            ______________________________________________________________________    Reason for visit: Pharmacy chart review completed prior to Specialty Medication Service physician visit. Medication list updated. Specialty Medication: Prolia 60 mg/mL PFS   Frequency: Every 6 months  Indication: Osteopenia  Additional Therapy:   · None  Previous Therapy:   · Off prolia during 2018    Specialist: Dr. Rushing Seen  Specialist Progress Note Available: Yes  Last Specialist Visit: 3/14/19   ______________________________________________________________________  Medical History Survey:  Specialty Medication Start Date: 2015  Appropriate Dose: Yes  ______________________________________________________________________  Current Medications and Allergies:     Allergies   Allergen Reactions    Adhesive Tape Other (See Comments)     Turned skin red    Codeine          Current Outpatient Medications   Medication Sig Dispense Refill    MYRBETRIQ 50 MG TB24 Take 50 mg by mouth daily 30 tablet 11    denosumab (PROLIA) 60 MG/ML SOLN SC injection Inject 1 mL into the skin once for 1 dose 1 mL 1    Probiotic Product (PROBIOTIC-10 PO) Take by mouth daily      fluticasone (FLONASE) 50 MCG/ACT nasal spray 2 sprays into each nostril daily (Patient taking differently: daily as needed 2 sprays into each nostril daily) 1 Bottle 1    tamoxifen (NOLVADEX) 20 MG tablet TAKE 1 TABLET BY MOUTH DAILY 90 tablet 3    loratadine (CLARITIN) 10 MG tablet Take 1 tablet by mouth daily (Patient taking differently: Take 10 mg by mouth daily as needed ) 30 tablet 3    MULTIPLE VITAMIN PO Take 1 tablet by mouth daily       Ascorbic Acid (VITAMIN C) 250 MG tablet Take 250 mg by mouth daily      aspirin-acetaminophen-caffeine (EXCEDRIN MIGRAINE) 250-250-65 MG per tablet Take 1 tablet by mouth every 6 hours as needed for Headaches      polyethylene glycol (GLYCOLAX) powder Take 17 g by mouth daily        No current facility-administered medications for this visit.      _____________________________________________________________________  Drug Interactions:  No clinically significant interactions identified via [employer] Interaction Analysis as category D or higher.  _____________________________________________________________________  Renal Dosing:  Creatinine Clearance: Estimated Creatinine Clearance: 101 mL/min (based on SCr of 0.62 mg/dL). No renal adjustments necessary. _____________________________________________________________________  Labs:  CBC:   Lab Results   Component Value Date    WBC 7.1 01/31/2019    HGB 15.0 01/31/2019     01/31/2019       BMP:   Lab Results   Component Value Date     01/31/2019    K 3.9 01/31/2019     01/31/2019    CO2 28 01/31/2019    BUN 12 01/31/2019    CREATININE 0.62 01/31/2019    GLUCOSE 80 01/31/2019       FASTING LIPID PANEL:   Lab Results   Component Value Date    CHOL 160 04/18/2018    HDL 48 04/18/2018    TRIG 115 04/18/2018       LFTS:   Lab Results   Component Value Date    AST 24 10/19/2017    ALT 20 10/19/2017    BILITOT 0.43 10/19/2017    ALKPHOS 45 10/19/2017     ______________________________________________________________________  Assessment/Plan:  No clinically significant drug/drug interactions identified. No recommendation with current therapy identified. Per Dr. Greene Bounds to be continued until the end of 2019. DEXA 01/2019 was stable. Patient should have periodic serum calcium monitored for hyper- or hypo-calcemia. Last calcium level WNL. Patient should ensure adequate calcium and vitamin D intake to prevent or treat hypocalcemia.  Calcium 1000 mg/day and vitamin D 400 units/day is recommended in product labeling.   ______________________________________________________________  Next Follow-Up: 4/22/19 with [facility] [place] physician    Harsh Jean, PharmD  PGY-2 Ambulatory Care Pharmacy Resident  [facility] ([facility]) Medication Management  Phone: [phone]  4/16/2019 4:51 PM

## 2019-04-22 ENCOUNTER — OFFICE VISIT (OUTPATIENT)
Dept: INTERNAL MEDICINE | Age: 54
End: 2019-04-22
Payer: COMMERCIAL

## 2019-04-22 VITALS
DIASTOLIC BLOOD PRESSURE: 89 MMHG | WEIGHT: 162 LBS | BODY MASS INDEX: 29.81 KG/M2 | HEART RATE: 102 BPM | SYSTOLIC BLOOD PRESSURE: 128 MMHG | HEIGHT: 62 IN

## 2019-04-22 DIAGNOSIS — M81.6 LOCALIZED OSTEOPOROSIS WITHOUT CURRENT PATHOLOGICAL FRACTURE: Primary | ICD-10-CM

## 2019-04-22 PROCEDURE — PHARMNEW PHARMACY SPECIALTY VISIT NEW: Performed by: INTERNAL MEDICINE

## 2019-04-22 ASSESSMENT — PATIENT HEALTH QUESTIONNAIRE - PHQ9
SUM OF ALL RESPONSES TO PHQ QUESTIONS 1-9: 0
SUM OF ALL RESPONSES TO PHQ QUESTIONS 1-9: 0
SUM OF ALL RESPONSES TO PHQ9 QUESTIONS 1 & 2: 0
2. FEELING DOWN, DEPRESSED OR HOPELESS: 0
1. LITTLE INTEREST OR PLEASURE IN DOING THINGS: 0

## 2019-04-22 ASSESSMENT — ENCOUNTER SYMPTOMS
EYE DISCHARGE: 0
ABDOMINAL PAIN: 0
SORE THROAT: 0
VOMITING: 0
COUGH: 0
DIARRHEA: 0
NAUSEA: 0
CONSTIPATION: 0
PHOTOPHOBIA: 0
SHORTNESS OF BREATH: 0
BLOOD IN STOOL: 0
WHEEZING: 0
COLOR CHANGE: 0
EYE PAIN: 0

## 2019-04-22 NOTE — PROGRESS NOTES
[facility]  [facility] [address] [facility]  [address]  Dept: [phone]  Dept Fax: [phone]    Initial Specialty Medication Office Visit  Date of patient's visit: 4/22/2019  Patient's Name:  Yanira De Leon YOB: 1965            Patient Care Team:  Marlene Meadows MD as PCP - General (Family Medicine)  Marlene Meadows MD as PCP - S Attributed Provider  Leilani Thomas DPM as Consulting Physician  Lani Tracey MD as Consulting Physician (Hematology and Oncology)  Tripp Ricardo DO as Consulting Physician (Obstetrics & Gynecology)  Lynn Lopez MD as Consulting Physician (Gastroenterology)  Sabina Manning MD as Surgeon (General Surgery)  MD Myra Vaughan, RN as Registered Nurse  ================================================================    REASON FOR VISIT/CHIEF COMPLAINT:  Medication Management    HISTORY OF PRESENTING ILLNESS:  History was obtained from: patient. Yanira De Leon is 48 y.o. is here for initial visit for specialty medication. Patient has history of osteoporosis due to radiation and chemo therapy. She is currently on Prolia. She has no side effects from this medications. She follows up regularly with her oncologist.  Yanira De Leon has no new complain today.        Patient Active Problem List   Diagnosis    Hyperlipidemia    Allergic rhinitis    MVP (mitral valve prolapse)    Malignant neoplasm of upper-outer quadrant of female breast ([facility] [address]    Ductal carcinoma in situ of left breast    Hepatic steatosis    Osteopenia    Herpes zoster without complication    Overactive bladder    Hx of tamoxifen therapy    NADEEN (stress urinary incontinence, female)       Health Maintenance Due   Topic Date Due    HIV screen  07/11/1982    Shingles Vaccine (1 of 2) 07/11/2017    DTaP/Tdap/Td vaccine (2 - Td) 07/20/2017       Allergies   Allergen Reactions    Adhesive Tape Other Gastrointestinal: Negative for abdominal pain, blood in stool, constipation, diarrhea, nausea and vomiting. Genitourinary: Negative for dysuria, frequency and urgency. Musculoskeletal: Negative for arthralgias and myalgias. Skin: Negative for color change and rash. Neurological: Negative for dizziness, tremors, seizures, syncope, weakness, numbness and headaches. Psychiatric/Behavioral: Negative for agitation, behavioral problems, confusion, sleep disturbance and suicidal ideas. PHYSICAL EXAM:  Vitals:    04/22/19 1556   BP: 128/89   Site: Right Upper Arm   Position: Sitting   Cuff Size: Medium Adult   Pulse: 102   Weight: 162 lb (73.5 kg)   Height: 5' 2\" (1.575 m)     BP Readings from Last 3 Encounters:   04/22/19 128/89   04/15/19 138/82   03/14/19 123/86        Physical Exam   Constitutional: She is oriented to person, place, and time. No distress. HENT:   Head: Normocephalic and atraumatic. Right Ear: External ear normal.   Left Ear: External ear normal.   Nose: Nose normal.   Mouth/Throat: No oropharyngeal exudate. Eyes: Pupils are equal, round, and reactive to light. EOM are normal.   Neck: Normal range of motion. Neck supple. No thyromegaly present. Cardiovascular: Normal rate, regular rhythm, normal heart sounds and intact distal pulses. Pulmonary/Chest: Effort normal and breath sounds normal. No respiratory distress. She has no wheezes. Abdominal: Soft. Bowel sounds are normal. She exhibits no distension and no mass. There is no tenderness. Musculoskeletal: Normal range of motion. She exhibits no edema or tenderness. Neurological: She is alert and oriented to person, place, and time. No cranial nerve deficit. Skin: Skin is warm. No rash noted. She is not diaphoretic. No erythema.    Psychiatric: Judgment normal.         DIAGNOSTIC FINDINGS:  CBC:  Lab Results   Component Value Date    WBC 7.1 01/31/2019    HGB 15.0 01/31/2019     01/31/2019       BMP:    Lab Results Component Value Date     01/31/2019    K 3.9 01/31/2019     01/31/2019    CO2 28 01/31/2019    BUN 12 01/31/2019    CREATININE 0.62 01/31/2019    GLUCOSE 80 01/31/2019       HEMOGLOBIN A1C: No results found for: LABA1C    FASTING LIPID PANEL:  Lab Results   Component Value Date    CHOL 160 04/18/2018    HDL 48 04/18/2018    TRIG 115 04/18/2018       ASSESSMENT AND PLAN:  Teetee Harkins was seen today for medication management. Diagnoses and all orders for this visit:    Localized osteoporosis without current pathological fracture  -     Discontinue: denosumab (PROLIA) 60 MG/ML SOSY SC injection; Inject 1 mL into the skin once for 1 dose  -     denosumab (PROLIA) 60 MG/ML SOSY SC injection; Inject 1 mL into the skin once for 1 dose      FOLLOW UP AND INSTRUCTIONS:  · Follow up with in one year. · Teetee Harkins received counseling on the following healthy behaviors: nutrition and exercise    · Discussed use, benefit, and side effects of prescribed medications. Barriers to medication compliance addressed. All patient questions answered. Pt voiced understanding. Darius Strange MD, HERMINIA, [address] Specialty Medication Service Program  4/22/2019, 4:42 PM    This note is created with the assistance of a speech-recognition program. While intending to generate a document that actually reflects the content of thevisit, the document can still have some mistakes which may not have been identified and corrected by editing.

## 2019-04-22 NOTE — PROGRESS NOTES
Visit Information    Have you changed or started any medications since your last visit including any over-the-counter medicines, vitamins, or herbal medicines? Yes Myrbetriq 50 mg daily, Keto 800 mg daily  Have you stopped taking any of your medications? Is so, why? -  Yes Tamoxifin  Are you having any side effects from any of your medications? - no    Have you seen any other physician or provider since your last visit?  no   Have you had any other diagnostic tests since your last visit?  no   Have you been seen in the emergency room and/or had an admission in a hospital since we last saw you?  no   Have you had your routine dental cleaning in the past 6 months?  yes -      Do you have an active MyChart account? If no, what is the barrier?   Yes    Patient Care Team:  Gisell Barakat MD as PCP - General (Family Medicine)  Gisell Barakat MD as PCP - S Attributed Provider  Aylin Resendez DPM as Consulting Physician  Alexus Garrett MD as Consulting Physician (Hematology and Oncology)  Laquita Gambino DO as Consulting Physician (Obstetrics & Gynecology)  France Maya MD as Consulting Physician (Gastroenterology)  Alice Bowens MD as Surgeon (General Surgery)  MD Anne Adlre, RN as Registered Nurse    Medical History Review  Past Medical, Family, and Social History reviewed and does not contribute to the patient presenting condition    Health Maintenance   Topic Date Due    HIV screen  07/11/1982    Shingles Vaccine (1 of 2) 07/11/2017    DTaP/Tdap/Td vaccine (2 - Td) 07/20/2017    Lipid screen  04/18/2023    Cervical cancer screen  12/11/2023    Colon cancer screen colonoscopy  11/18/2027    Flu vaccine  Completed    Pneumococcal 0-64 years Vaccine  Aged Out

## 2019-04-23 NOTE — FLOWSHEET NOTE
[] [address]  [address]    [phone]  F: [phone]   [] [address]  [facility]   [address]  P: [phone]  F: [phone]  [x] [address]  [address] [facility]  P: [phone]  F: [phone]   [] [address]  [facility] [address]   [place]: [phone]  F: [phone]  [] [address]  [place]: [phone]   F: [phone]     Physical Therapy Cancel/No Show note    Date: 2019  Patient: Alexis Roger  : 1965  MRN: [id number]    Cancels/No Shows to date:     For today's appointment patient:    [x]  Cancelled    [] Rescheduled appointment    [] No-show     Reason given by patient:    []  Patient ill    []  Conflicting appointment    [] No transportation      [] Conflict with work    [] No reason given    [] Weather related    [x] Other:      Comments: Pt called and left voicemail stating she had to CX tomorrow's appointment because she has a lot going on and is too busy. Pt left phone number and asked to be called back to r/s appointment.        [] Next appointment was confirmed    Electronically signed by: Booker Lobato PTA

## 2019-04-24 ENCOUNTER — HOSPITAL ENCOUNTER (OUTPATIENT)
Dept: PHYSICAL THERAPY | Facility: CLINIC | Age: 54
Setting detail: THERAPIES SERIES
Discharge: HOME | End: 2019-04-24
Payer: COMMERCIAL

## 2019-05-02 ENCOUNTER — PROCEDURE VISIT (OUTPATIENT)
Dept: UROLOGY | Age: 54
End: 2019-05-02
Payer: COMMERCIAL

## 2019-05-02 VITALS
HEIGHT: 62 IN | HEART RATE: 69 BPM | WEIGHT: 163.14 LBS | TEMPERATURE: 97.8 F | DIASTOLIC BLOOD PRESSURE: 84 MMHG | BODY MASS INDEX: 30.02 KG/M2 | SYSTOLIC BLOOD PRESSURE: 136 MMHG

## 2019-05-02 DIAGNOSIS — N39.3 SUI (STRESS URINARY INCONTINENCE, FEMALE): Primary | ICD-10-CM

## 2019-05-02 DIAGNOSIS — R39.15 URGENCY OF URINATION: ICD-10-CM

## 2019-05-02 PROCEDURE — 51728 CYSTOMETROGRAM W/VP: CPT | Performed by: UROLOGY

## 2019-05-02 PROCEDURE — 51784 ANAL/URINARY MUSCLE STUDY: CPT | Performed by: UROLOGY

## 2019-05-02 PROCEDURE — 51797 INTRAABDOMINAL PRESSURE TEST: CPT | Performed by: UROLOGY

## 2019-05-02 PROCEDURE — 51741 ELECTRO-UROFLOWMETRY FIRST: CPT | Performed by: UROLOGY

## 2019-05-20 ENCOUNTER — PROCEDURE VISIT (OUTPATIENT)
Dept: UROLOGY | Age: 54
End: 2019-05-20
Payer: COMMERCIAL

## 2019-05-20 VITALS
WEIGHT: 163 LBS | HEART RATE: 63 BPM | BODY MASS INDEX: 30 KG/M2 | TEMPERATURE: 97.4 F | DIASTOLIC BLOOD PRESSURE: 92 MMHG | HEIGHT: 62 IN | SYSTOLIC BLOOD PRESSURE: 142 MMHG

## 2019-05-20 DIAGNOSIS — N39.3 SUI (STRESS URINARY INCONTINENCE, FEMALE): Primary | ICD-10-CM

## 2019-05-20 PROCEDURE — 52000 CYSTOURETHROSCOPY: CPT | Performed by: UROLOGY

## 2019-06-25 ENCOUNTER — EMPLOYEE WELLNESS (OUTPATIENT)
Dept: OTHER | Age: 54
End: 2019-06-25

## 2019-06-25 LAB
CHOLESTEROL/HDL RATIO: 3.8
CHOLESTEROL: 206 MG/DL
GLUCOSE BLD-MCNC: 100 MG/DL (ref 70–99)
HDLC SERPL-MCNC: 54 MG/DL
LDL CHOLESTEROL: 138 MG/DL (ref 0–130)
PATIENT FASTING?: YES
TRIGL SERPL-MCNC: 72 MG/DL
VLDLC SERPL CALC-MCNC: ABNORMAL MG/DL (ref 1–30)

## 2019-07-01 VITALS — WEIGHT: 154 LBS | BODY MASS INDEX: 28.17 KG/M2

## 2019-08-27 ENCOUNTER — TELEPHONE (OUTPATIENT)
Dept: ONCOLOGY | Age: 54
End: 2019-08-27

## 2020-03-02 RX ORDER — FLUTICASONE PROPIONATE 50 MCG
SPRAY, SUSPENSION (ML) NASAL
Qty: 16 G | Refills: 2 | Status: SHIPPED | OUTPATIENT
Start: 2020-03-02

## 2020-03-17 ENCOUNTER — OFFICE VISIT (OUTPATIENT)
Dept: ONCOLOGY | Age: 55
End: 2020-03-17
Payer: COMMERCIAL

## 2020-03-17 VITALS
WEIGHT: 146.8 LBS | BODY MASS INDEX: 26.85 KG/M2 | SYSTOLIC BLOOD PRESSURE: 163 MMHG | DIASTOLIC BLOOD PRESSURE: 104 MMHG | HEART RATE: 55 BPM | TEMPERATURE: 96.9 F

## 2020-03-17 PROCEDURE — 99211 OFF/OP EST MAY X REQ PHY/QHP: CPT | Performed by: INTERNAL MEDICINE

## 2020-03-17 PROCEDURE — 99214 OFFICE O/P EST MOD 30 MIN: CPT | Performed by: INTERNAL MEDICINE

## 2020-03-17 NOTE — PROGRESS NOTES
distant recurrence at the next 5 years, assuming tamoxifen treatment. I had a long discussion with the patient, this is a high risk of recurrence and the patient would be eligible for adjuvant chemotherapy. She agreed to be treated with TC adjuvant chemotherapy. Chemotherapy was generally well tolerated. She finished 4 cycles and then was started on tamoxifen 07/2014. Vaginal dryness and bleeding, frequent yeast infections - plan to hold Tamoxifen 8 weeks 01/2019, completed therapy. DEXA 01/2019 was stable, plan for 1 more year of Prolia. INTERIM HISTORY: The patient is seen today for follow up for breast cancer. She is doing well with no issues or complaints. She denies any chest pain, shortness of breath, fever. She does have occasional pain in mastectomy and reconstruction area, she consulted with plastic surgeon regarding recall on silicon implants but was not satisfied with answer. PAST MEDICAL HISTORY: has a past medical history of Hyperlipidemia; Allergic rhinitis; MVP (mitral valve prolapse); Cancer; Hepatic steatosis; Port catheter in place; and Port catheter in place. PAST SURGICAL HISTORY: has past surgical history that includes Mastectomy (Bilateral, 02/19/14) and Breast reconstruction (Bilateral, 02/19/14). CURRENT MEDICATIONS:  has a current medication list which includes the following prescription(s): prochlorperazine, omeprazole, magic mouthwash, sennosides-docusate sodium, and oxycodone-acetaminophen. ALLERGIES:  is allergic to adhesive tape and codeine. FAMILY HISTORY: Negative for any hematological or oncological conditions. SOCIAL HISTORY:  reports that she has never smoked. She has never used smokeless tobacco. She reports that she does not drink alcohol or use illicit drugs. REVIEW OF SYSTEMS:     General: No fever or night sweats. Weight is stable. Hot flashes are unchanged. Eyes: No double or blurred vision.   Ears: No tinnitus or hearing problem,    Throat: No dysphagia or sore throat   Respiratory: No chest pain, no shortness of breath, no cough or hemoptysis. Cardiovascular: Denies chest pain, PND or orthopnea. No L E swelling or palpitations. Gastrointestinal: No nausea and no vomiting, no  abdominal pain, diarrhea or constipation. She has mild heartburn and prilosec helps. Genitourinary: Denies dysuria, hematuria, frequency, urgency. Occasional stress incontinence - worse  Gynecological: Some dryness and bleeding as noted above, frequent yeast infections  Neurological: Denies headaches, decreased LOC, no sensory or motor focal deficits. Musculoskeletal: No arthralgia no back pain or joint swelling, mild discomfort in the shoulder and pectroalis area, relieved by rest  Skin: There are hives present, no bleeding. Psychiatric: No anxiety, no depression. Endocrine: No diabetes or thyroid disease. Hematologic: No bleeding, no adenopathy. PHYSICAL EXAM:  The patient is not in acute distress. Vital signs: not currently breastfeeding. , last menstrual period 03/01/2014. HEENT:  Eyes are normal. Ears, nose and throat are normal.  Neck: Supple. No lymph node enlargement. No thyroid enlargement. Trachea is centrally located. Chest: Clear to auscultation. No wheezes or crepitations. Breast: Hives on upper part of the chest, Bilateral breast implants, well healed, n masses, no lesions, tattoos are in place Heart: Regular sinus rhythm. Abdomen: Soft, nontender. No hepatosplenomegaly. No masses. Extremities:  With no edema. Lymph Nodes:  No cervical, axillary or inguinal lymph node enlargement. No lymphedema. Neurologic: Conscious and oriented. No focal neurological deficits. Psychosocial: No depression, anxiety or stress. Skin: No rashes, bruises or ecchymoses.        REVIEW OF LABORATORY DATA:   Lab Results   Component Value Date    WBC 7.1 01/31/2019    HGB 15.0 01/31/2019    HCT 43.2 01/31/2019    MCV 87.1 01/31/2019     01/31/2019 Chemistry        Component Value Date/Time     01/31/2019 1528    K 3.9 01/31/2019 1528     01/31/2019 1528    CO2 28 01/31/2019 1528    BUN 12 01/31/2019 1528    CREATININE 0.62 01/31/2019 1528        Component Value Date/Time    CALCIUM 9.7 01/31/2019 1528    ALKPHOS 45 10/19/2017 1226    AST 24 10/19/2017 1226    ALT 20 10/19/2017 1226    BILITOT 0.43 10/19/2017 1226        REVIEW OF RADIOLOGICAL RESULTS:  01/22/2019 DEXA:   T-SCORE:   06/2015 01/2019  LUMBAR:    -2.1  -1.9  LEFT HIP:  0.3  -0.2  FEMORAL NECK:   0.2  -0.4      IMPRESSION:   1. Stage 1(T1c, N0,M0) invasive ductal cancer, ER positive, HER2 negative. S/P bilateral mastectomy   2. Mitral valve prolapse, stable. 3. On tamoxifen, well tolerated, vaginal dryness - held for 8 weeks 01/2019, competed therapy   4. Osteopenia, on Prolia, off during 2018, plan for 1 more year 2019     PLAN:   1. We discussed her concerns regarding implants. 2. Exam is negative and she is doing very well in remission. 3. We will continue with surveillance. 4. I am referring her to Dr. Nakita Rincon. 5. Return in 1 year.

## 2020-03-18 ENCOUNTER — TELEPHONE (OUTPATIENT)
Dept: ONCOLOGY | Age: 55
End: 2020-03-18

## 2020-07-02 NOTE — PATIENT INSTRUCTIONS
LEFT MESSAGE FOR PATIENT TO CALL BACK FOR COVID SCREENING-KL[facility]   rv in one year  Refer to Dr Jessica Palacios

## 2020-11-06 ENCOUNTER — HOSPITAL ENCOUNTER (OUTPATIENT)
Dept: PREADMISSION TESTING | Age: 55
Discharge: HOME | End: 2020-11-10
Payer: COMMERCIAL

## 2020-11-06 VITALS
HEIGHT: 62 IN | RESPIRATION RATE: 18 BRPM | WEIGHT: 156.5 LBS | SYSTOLIC BLOOD PRESSURE: 161 MMHG | TEMPERATURE: 97.1 F | HEART RATE: 64 BPM | BODY MASS INDEX: 28.8 KG/M2 | DIASTOLIC BLOOD PRESSURE: 92 MMHG

## 2020-11-06 LAB
ANION GAP SERPL CALCULATED.3IONS-SCNC: 10 MMOL/L (ref 9–17)
BUN BLDV-MCNC: 16 MG/DL (ref 6–20)
CHLORIDE BLD-SCNC: 104 MMOL/L (ref 98–107)
CO2: 26 MMOL/L (ref 20–31)
CREAT SERPL-MCNC: 0.67 MG/DL (ref 0.5–0.9)
GFR AFRICAN AMERICAN: >60 ML/MIN
GFR NON-AFRICAN AMERICAN: >60 ML/MIN
GFR SERPL CREATININE-BSD FRML MDRD: NORMAL ML/MIN/{1.73_M2}
GFR SERPL CREATININE-BSD FRML MDRD: NORMAL ML/MIN/{1.73_M2}
HCT VFR BLD CALC: 44.6 % (ref 36.3–47.1)
HEMOGLOBIN: 15.5 G/DL (ref 11.9–15.1)
MCH RBC QN AUTO: 30.4 PG (ref 25.2–33.5)
MCHC RBC AUTO-ENTMCNC: 34.8 G/DL (ref 28.4–34.8)
MCV RBC AUTO: 87.5 FL (ref 82.6–102.9)
NRBC AUTOMATED: 0 PER 100 WBC
PDW BLD-RTO: 11.9 % (ref 11.8–14.4)
PLATELET # BLD: 330 K/UL (ref 138–453)
PMV BLD AUTO: 9.3 FL (ref 8.1–13.5)
POTASSIUM SERPL-SCNC: 3.9 MMOL/L (ref 3.7–5.3)
RBC # BLD: 5.1 M/UL (ref 3.95–5.11)
SODIUM BLD-SCNC: 140 MMOL/L (ref 135–144)
WBC # BLD: 5.8 K/UL (ref 3.5–11.3)

## 2020-11-06 PROCEDURE — 82565 ASSAY OF CREATININE: CPT

## 2020-11-06 PROCEDURE — 80051 ELECTROLYTE PANEL: CPT

## 2020-11-06 PROCEDURE — 36415 COLL VENOUS BLD VENIPUNCTURE: CPT

## 2020-11-06 PROCEDURE — 85027 COMPLETE CBC AUTOMATED: CPT

## 2020-11-06 PROCEDURE — 93005 ELECTROCARDIOGRAM TRACING: CPT

## 2020-11-06 PROCEDURE — 84520 ASSAY OF UREA NITROGEN: CPT

## 2020-11-06 NOTE — PRE-PROCEDURE INSTRUCTIONS
The night before your surgery:      You will need to shower with warm water (not hot) and the CHG soap.  Use a clean wash cloth and a clean towel. Have clean clothes available to put on after the shower.   First wash your hair with regular shampoo. Rinse your hair and body thoroughly to remove the shampoo.  Wash your face and genital area (private parts) with your regular soap or water only. Thoroughly rinse your body with warm water from the neck down.  Turn water off to prevent rinsing the soap off too soon.  With a clean wet washcloth and half of the CHG soap in the bottle, lather your entire body from the neck down. Do not use CHG soap near your eyes or ears to avoid injury to those areas.  Wash thoroughly, paying special attention to the area where your surgery will be performed.  Wash your body gently for five (5) minutes. Avoid scrubbing your skin too hard.  Turn the water back on and rinse your body thoroughly.  Pat yourself dry with a clean, soft towel. Do not apply lotion, cream or powder.  Dress with clean freshly washed clothes. The morning of surgery:     Repeat shower following steps above - using remaining half of CHG soap in bottle. Patient Instructions:    [place] If you are having any type of anesthesia you are to have nothing to eat or drink after midnight the night before your surgery. This includes gum, hard candy, mints, water or smoking or chewing tobacco.  The only exception to this is a small sip of water to take with any morning dose of heart, blood pressure, or seizure medications. No alcoholic beverages for 24 hours prior to surgery.  Brush your teeth but do not swallow water.  Bring your eyeglasses and case with you. No contacts are to be worn the day of surgery. You also may bring your hearing aids. Most surgical procedures involving anesthesia will require that you remove your dentures prior to surgery.      If you are on C-PAP or Bi-PAP at home and plan on staying in the hospital overnight for your surgery please bring the machine with you. · Do not wear any jewelry or body piercings day of surgery. Also, NO lotion, perfume or deodorant to be used the day of surgery. No nail polish on the operative extremity (arm/leg surgeries)    · If you are staying overnight with us, please bring a small bag of necessary personal items.  Please wear loose, comfortable clothing. If you are potentially going to have a cast or brace bring clothing that will fit over them.  In case of illness - If you have cold or flu like symptoms (high fever, runny nose, sore throat, cough, etc.) rash, nausea, vomiting, loose stools, and/or recent contact with someone who has a contagious disease (chicken pox, measles, etc.) Please call your doctor before coming to the hospital.         Day of Surgery/Procedure:    As a patient at [place] [address] you can expect quality medical and nursing care that is centered on your individual needs. Our goal is to make your surgical experience as comfortable as possible    . Transportation After Your Surgery/Procedure: You will need a friend or family member to drive you home after your procedure. Your  must be 25years of age or older. Discharge instructions will be reviewed with family/friend by phone. A taxi cab or any other form of public transportation is not acceptable. Someone must remain with you for the first 24 hours after your surgery if you receive anesthesia or medication. If you do not have someone to stay with you, your procedure may be cancelled.       If you have any other questions regarding your procedure or the day of surgery, please call [phone]    COVID testing 11/16 @ 9:00am    _________________________  ____________________________

## 2020-11-06 NOTE — PROGRESS NOTES
History and Physical    Pt Name: John Hunter  MRN: [id number]  YOB: 1965  Date of evaluation: 11/6/2020  THIS IS JESSICA GUZMAN , A 51 YO  FEMALE PATIENT OF DR. NICK WHO PRESENTS TODAY FOR  A PRE-TESTING APPOINTMENT PRIOR TO A REVISION BREAST RECONSTRUCTION WITH BILATERAL IMPLANT REMOVAL ,BILATERAL CAPSULECTOMY ,BILATERAL TRANSFER PECTORALIS MAJOR MUSCLE TO CHEST WALL AND BILATERAL TISSUE EXPANDERS INSERTION FOR TREATMENT OF BILATERAL BREAST PAIN SECONDARY TO BILATERAL IMPLANT ANIMATION DEFORMITY. THE PATIENT IS S/P BILATERAL MASTECTOMY WITH IMMEDIATE RECONSTRUCTION FOR TREATMENT OF BREAST CANCER IN 2014. SHE WAS NOT TREATED WITH RADIATION. SHE DID RECEIVE CHEMOTHERAPY. THE PATIENT IS HEALTHY AND WORKS INTERNALLY AT [facility] AS A . [x] Please see the H&P  SENT BY 'S OFFICE  dated 11/05/2020  in  THE SHORT CHART.    SHANNA Mattson, ACNP-BC  Electronically signed 11/6/2020 at 10:06 AM

## 2020-11-10 LAB
EKG ATRIAL RATE: 56 BPM
EKG P AXIS: 43 DEGREES
EKG P-R INTERVAL: 146 MS
EKG Q-T INTERVAL: 432 MS
EKG QRS DURATION: 84 MS
EKG QTC CALCULATION (BAZETT): 416 MS
EKG R AXIS: 41 DEGREES
EKG T AXIS: 37 DEGREES
EKG VENTRICULAR RATE: 56 BPM

## 2020-11-12 ENCOUNTER — OFFICE VISIT (OUTPATIENT)
Dept: FAMILY MEDICINE CLINIC | Age: 55
End: 2020-11-12
Payer: COMMERCIAL

## 2020-11-12 VITALS
RESPIRATION RATE: 16 BRPM | WEIGHT: 156.4 LBS | BODY MASS INDEX: 28.61 KG/M2 | TEMPERATURE: 97.5 F | DIASTOLIC BLOOD PRESSURE: 86 MMHG | HEART RATE: 64 BPM | SYSTOLIC BLOOD PRESSURE: 126 MMHG

## 2020-11-12 PROCEDURE — 99214 OFFICE O/P EST MOD 30 MIN: CPT | Performed by: NURSE PRACTITIONER

## 2020-11-12 RX ORDER — MULTIVIT-MIN/IRON/FOLIC ACID/K 18-600-40
CAPSULE ORAL
COMMUNITY

## 2020-11-12 SDOH — ECONOMIC STABILITY: FOOD INSECURITY: WITHIN THE PAST 12 MONTHS, THE FOOD YOU BOUGHT JUST DIDN'T LAST AND YOU DIDN'T HAVE MONEY TO GET MORE.: NEVER TRUE

## 2020-11-12 SDOH — ECONOMIC STABILITY: FOOD INSECURITY: WITHIN THE PAST 12 MONTHS, YOU WORRIED THAT YOUR FOOD WOULD RUN OUT BEFORE YOU GOT MONEY TO BUY MORE.: NEVER TRUE

## 2020-11-12 SDOH — ECONOMIC STABILITY: INCOME INSECURITY: HOW HARD IS IT FOR YOU TO PAY FOR THE VERY BASICS LIKE FOOD, HOUSING, MEDICAL CARE, AND HEATING?: NOT HARD AT ALL

## 2020-11-12 ASSESSMENT — PATIENT HEALTH QUESTIONNAIRE - PHQ9
SUM OF ALL RESPONSES TO PHQ QUESTIONS 1-9: 0
SUM OF ALL RESPONSES TO PHQ QUESTIONS 1-9: 0
2. FEELING DOWN, DEPRESSED OR HOPELESS: 0
SUM OF ALL RESPONSES TO PHQ QUESTIONS 1-9: 0
1. LITTLE INTEREST OR PLEASURE IN DOING THINGS: 0
SUM OF ALL RESPONSES TO PHQ9 QUESTIONS 1 & 2: 0

## 2020-11-12 ASSESSMENT — ENCOUNTER SYMPTOMS
VOMITING: 0
WHEEZING: 0
NAUSEA: 0
SHORTNESS OF BREATH: 0
ABDOMINAL PAIN: 0

## 2020-11-12 NOTE — PROGRESS NOTES
Subjective:      Patient ID: Sacha Roman is a 50 y.o. female. Visit Information    Have you changed or started any medications since your last visit including any over-the-counter medicines, vitamins, or herbal medicines? no   Are you having any side effects from any of your medications? -  no  Have you stopped taking any of your medications? Is so, why? -  no    Have you seen any other physician or provider since your last visit? Yes - Records Obtained  Have you had any other diagnostic tests since your last visit? Yes - Records Obtained  Have you been seen in the emergency room and/or had an admission to a hospital since we last saw you? No  Have you had your routine dental cleaning in the past 6 months? no    Have you activated your [employer] account? If not, what are your barriers?  Yes     Patient Care Team:  Naz Estevez MD as PCP - General (Family Medicine)  Naz Estevez MD as PCP - [facility] Provider  Kuldeep Jain DPM as Consulting Physician  Alexandra Coelho MD as Consulting Physician (Hematology and Oncology)  Concetta Messina DO as Consulting Physician (Obstetrics & Gynecology)  Jasmin Fuentes MD as Consulting Physician (Gastroenterology)  Anatoliy Connor MD as Surgeon (General Surgery)  MD Carmelina Abernathy, RN as Registered Nurse    Medical History Review  Past Medical, Family, and Social History reviewed and does contribute to the patient presenting condition    Health Maintenance   Topic Date Due    HIV screen  07/11/1982    Shingles Vaccine (1 of 2) 07/11/2017    DTaP/Tdap/Td vaccine (2 - Td) 07/20/2017    Flu vaccine (1) 09/01/2020    Cervical cancer screen  12/11/2023    Lipid screen  06/25/2024    Colon cancer screen colonoscopy  11/18/2027    Hepatitis A vaccine  Aged Out    Hepatitis B vaccine  Aged Out    Hib vaccine  Aged Out    Meningococcal (ACWY) vaccine  Aged Out    Pneumococcal 0-64 years Vaccine  Aged Out     HPI     50 year old female presents with evaluation for breast implants removal surgery and pre op clearance. Pt has hx of bilateral mastectomy 2014 with breast implants. They start bothering her when she does certain movements. Pt is scheduled with Dr. Debora Calzada for implant removal surgery on 11/20 and needs pre op clearance. Blood tests were unremarkable and ekg showed sinus bradycardia with HR 56. Currently is not bp medication and it is table today. States she had some  heart arhythmia over 20 years ago when she delivered her child and had episodes of low heart rate. Hx of MVP  Currently follows up with Dr. Florentino Cloud for breast cancer. Review of Systems   Constitutional: Negative for chills and fever. Respiratory: Negative for shortness of breath and wheezing. Cardiovascular: Negative for chest pain and leg swelling. Gastrointestinal: Negative for abdominal pain, nausea and vomiting. Neurological: Negative for dizziness, weakness and numbness. Psychiatric/Behavioral: Negative for agitation and behavioral problems. Objective:   Physical Exam  Vitals signs and nursing note reviewed. Constitutional:       General: She is not in acute distress. Appearance: Normal appearance. HENT:      Nose: Nose normal. No congestion. Eyes:      General: No scleral icterus. Conjunctiva/sclera: Conjunctivae normal.   Neck:      Musculoskeletal: Normal range of motion and neck supple. Cardiovascular:      Rate and Rhythm: Normal rate and regular rhythm. Pulses: Normal pulses. Heart sounds: Normal heart sounds. Pulmonary:      Effort: Pulmonary effort is normal. No respiratory distress. Breath sounds: Normal breath sounds. Abdominal:      Palpations: Abdomen is soft. Tenderness: There is no abdominal tenderness. Musculoskeletal: Normal range of motion. General: No tenderness. Skin:     General: Skin is warm and dry.    Neurological:      Mental Status: She is alert and oriented

## 2020-11-16 ENCOUNTER — HOSPITAL ENCOUNTER (OUTPATIENT)
Dept: PREADMISSION TESTING | Age: 55
Setting detail: SPECIMEN
Discharge: HOME | End: 2020-11-20
Payer: COMMERCIAL

## 2020-11-16 PROCEDURE — U0003 INFECTIOUS AGENT DETECTION BY NUCLEIC ACID (DNA OR RNA); SEVERE ACUTE RESPIRATORY SYNDROME CORONAVIRUS 2 (SARS-COV-2) (CORONAVIRUS DISEASE [COVID-19]), AMPLIFIED PROBE TECHNIQUE, MAKING USE OF HIGH THROUGHPUT TECHNOLOGIES AS DESCRIBED BY CMS-2020-01-R: HCPCS

## 2020-11-18 LAB — SARS-COV-2, NAA: NOT DETECTED

## 2020-12-23 ENCOUNTER — TELEPHONE (OUTPATIENT)
Dept: INFUSION THERAPY | Age: 55
End: 2020-12-23

## 2020-12-23 NOTE — TELEPHONE ENCOUNTER
Patient had questions about wether to take the covid vaccine. She wanted Dr. Osmar Marmolejo opinion on it. I explained he is out of the office till after the first of the year. And I could not speak for him. She should do her own research and make an educated decision. She verbalized understanding.

## 2021-04-05 ENCOUNTER — OFFICE VISIT (OUTPATIENT)
Dept: OBGYN CLINIC | Age: 56
End: 2021-04-05
Payer: COMMERCIAL

## 2021-04-05 VITALS
BODY MASS INDEX: 27.8 KG/M2 | DIASTOLIC BLOOD PRESSURE: 64 MMHG | SYSTOLIC BLOOD PRESSURE: 108 MMHG | RESPIRATION RATE: 16 BRPM | WEIGHT: 152 LBS

## 2021-04-05 DIAGNOSIS — Z85.3 HISTORY OF LEFT BREAST CANCER: ICD-10-CM

## 2021-04-05 DIAGNOSIS — N93.9 EPISODE OF HEAVY VAGINAL BLEEDING: Primary | ICD-10-CM

## 2021-04-05 DIAGNOSIS — R63.5 WEIGHT GAIN: ICD-10-CM

## 2021-04-05 DIAGNOSIS — Z98.82 S/P BILATERAL BREAST IMPLANTS: ICD-10-CM

## 2021-04-05 PROCEDURE — 99203 OFFICE O/P NEW LOW 30 MIN: CPT | Performed by: OBSTETRICS & GYNECOLOGY

## 2021-04-05 NOTE — PROGRESS NOTES
Natalie Dawson  2021    YOB: 1965    The patient was seen today. She is here regarding AUB . Her bowels are regular and she is voiding without difficulty. HPI:  Natalie Dawson is a 50 y.o. female     Pt. Here to discuss change in her periods. She was diagnosed with Breast CA in . She sees Dr. Miguel A Esquivel. She had mastectomy and has reconstructions with implants. She was on tamoxifen for 5 years and has been off of it for 2 years. She states that she was supposed to follow up with her reconstructive surgeon, but he is no longer practicing, so referral was given today. She states that she has had regular periods but ever since cancer treatment she will miss a period every few months. However in march she had a very heavy period that required a pad and tampon at same time and she was saturating in just a few hours. She states that this was abnormal for her and she is concerned given her history. She has NADEEN and requires two thin pads daily for leakage. She has 2  and she lifts 5 gallon buckets throughout day as she is a . She was established with Moustapha Olmedo, but states she needs to reestablish care - referral given. Denies Dyspareunia, denies bowel dysfunction. She is concerned about weight and what she is eating. Dietician referral placed.           OB History    Para Term  AB Living   2 0 0 0 0 2   SAB TAB Ectopic Molar Multiple Live Births   0 0 0 0 0 0      # Outcome Date GA Lbr Jason/2nd Weight Sex Delivery Anes PTL Lv   2             1                 Past Medical History:   Diagnosis Date    Allergic rhinitis 2013    Cancer ([facility] [address] 2014    Audria Warren /  double mastectomy    Constipation     Dyslexia     GERD (gastroesophageal reflux disease)     Hepatic steatosis 2014    History of blood transfusion     History of chemotherapy 2013    4 treatments 2-3 weeks apart (4 total treatments)    History of irregular heartbeat     HTN (hypertension)     not currently on medication (11/6/20)    Hyperlipidemia 7/12/2013    MVP (mitral valve prolapse) 7/12/2013    PONV (postoperative nausea and vomiting)     Port catheter in place    [facility] in place     Stress incontinence     Wears glasses     reading glasses       Past Surgical History:   Procedure Laterality Date    BREAST BIOPSY Left     x 2    BREAST RECONSTRUCTION Bilateral 02/19/14    CENTRAL VENOUS CATHETER  7/24/14    removal of port Dr Lorena Pettit COLONOSCOPY  11/18/2017    no lesions seen    MASTECTOMY Bilateral 02/19/14    Dr Lorena Pettit OTHER SURGICAL HISTORY Bilateral 1/21/15    nipple reconstruction    NE COLON CA SCRN NOT  [address] IND N/A 11/18/2017    COLONOSCOPY performed by Daria Mata MD at [address]      (Dr Luis Guerrero)  port was removed 2015    WISDOM TOOTH EXTRACTION         Family History   Problem Relation Age of Onset    High Blood Pressure Mother     Allergies Mother     Heart Disease Father         bypass x5    High Blood Pressure Father     Diabetes Father     Allergies Brother        Social History     Socioeconomic History    Marital status:      Spouse name: Not on file    Number of children: Not on file    Years of education: Not on file    Highest education level: Not on file   Occupational History    Not on file   Social Needs    Financial resource strain: Not hard at all   dscout insecurity     Worry: Never true     Inability: Never true   ONE [employer]OVERY needs     Medical: Not on file     Non-medical: Not on file   Tobacco Use    Smoking status: Never Smoker    Smokeless tobacco: Never Used   Substance and Sexual Activity    Alcohol use:  Yes     Alcohol/week: 0.0 standard drinks     Comment: rarely    Drug use: No    Sexual activity: Yes     Partners: Male   Lifestyle    Physical activity     Days per week: Not on file     Minutes per session: Not on file    Stress: Not on file   Relationships    Social connections     Talks on phone: Not on file     Gets together: Not on file     Attends Scientologist service: Not on file     Active member of club or organization: Not on file     Attends meetings of clubs or organizations: Not on file     Relationship status: Not on file    Intimate partner violence     Fear of current or ex partner: Not on file     Emotionally abused: Not on file     Physically abused: Not on file     Forced sexual activity: Not on file   Other Topics Concern    Not on file   Social History Narrative    Not on file         MEDICATIONS:  Current Outpatient Medications   Medication Sig Dispense Refill    Cholecalciferol (VITAMIN D) 50 MCG (2000 UT) CAPS capsule Take by mouth      Aspirin-Acetaminophen-Caffeine (EXCEDRIN PO) Take 1 tablet by mouth as needed (headaches)      fluticasone (FLONASE) 50 MCG/ACT nasal spray instill 2 sprays into each nostril once daily (Patient taking differently: instill 2 sprays into each nostril once daily  Patient uses PRN) 16 g 2    Probiotic Product (PROBIOTIC-10 PO) Take by mouth daily Indications: OTC Patient uses PRN      MULTIPLE VITAMIN PO Take 1 tablet by mouth daily       Ascorbic Acid (VITAMIN C) 250 MG tablet Take 250 mg by mouth daily Patient uses PRN      polyethylene glycol (GLYCOLAX) powder Take 17 g by mouth daily        No current facility-administered medications for this visit. ALLERGIES:  Allergies as of 04/05/2021 - Review Complete 11/12/2020   Allergen Reaction Noted    Adhesive tape Other (See Comments) 03/31/2014    Codeine  07/12/2013         REVIEW OF SYSTEMS:       A minimum of an eleven point review of systems was completed. Review Of Systems (11 point):  Constitutional: No fever, chills or malaise;  No weight change or fatigue  Head and Eyes: No vision, Headache, Dizziness or trauma in last 12 months  ENT ROS: No hearing, Tinnitis, sinus or taste problems  Hematological and Lymphatic ROS:No Lymphoma, Von Willebrand's, Hemophillia or Bleeding History  Psych ROS: No Depression, Homicidal thoughts,suicidal thoughts, or anxiety  Breast ROS: No prior breast abnormalities or lumps +H/o breast CA  Respiratory ROS: No SOB, Pneumoniae,Cough, or Pulmonary Embolism History   Cardiovascular ROS: No Chest Pain with Exertion, Palpitations, Syncope, Edema, Arrhythmia  Gastrointestinal ROS: No Indigestion, Heartburn, Nausea, vomiting, Diarrhea, Constipation,or Bowel Changes; No Bloody Stools or melena  Genito-Urinary ROS: No Dysuria, Hematuria or Nocturia. No Urinary Incontinence or Vaginal Discharge +HMB, +AUB  Musculoskeletal ROS: No Arthralgia, Arthritis,Gout,Osteoporosis or Rheumatism  Neurological ROS: No CVA, Migraines, Epilepsy, Seizure Hx, or Limb Weakness  Dermatological ROS: No Rash, Itching, Hives, Mole Changes or Cancer          Blood pressure 108/64, resp. rate 16, weight 152 lb (68.9 kg), last menstrual period 04/03/2021, not currently breastfeeding. Abdomen: Soft non-tender; good bowel sounds. No guarding, rebound or rigidity. No CVA tenderness bilaterally. Extremities: No calf tenderness, DTR 2/4, and No edema bilaterally    Pelvic: Exam deferred. Diagnostics:  No results found. Lab Results:  Results for orders placed or performed during the hospital encounter of 11/16/20   Covid-19 Ambulatory   Result Value Ref Range    SARS-CoV-2, MIKE Not Detected Not Detected         Assessment:   Diagnosis Orders   1. Episode of heavy vaginal bleeding  US PELVIS COMPLETE    US Non OB Transvaginal    CBC Auto Differential    TSH with Reflex    Protime-INR    APTT    Hemoglobin A1C   2. S/P bilateral breast implants  External Referral To Breast Clinic   3. History of left breast cancer  External Referral To Breast Clinic   4.  Weight gain  [address]     Patient Active Problem List    Diagnosis Date Noted    Hx of

## 2021-04-20 ENCOUNTER — TELEPHONE (OUTPATIENT)
Dept: ONCOLOGY | Age: 56
End: 2021-04-20

## 2021-04-20 ENCOUNTER — OFFICE VISIT (OUTPATIENT)
Dept: ONCOLOGY | Age: 56
End: 2021-04-20
Payer: COMMERCIAL

## 2021-04-20 VITALS
DIASTOLIC BLOOD PRESSURE: 87 MMHG | WEIGHT: 156.3 LBS | BODY MASS INDEX: 28.59 KG/M2 | HEART RATE: 66 BPM | TEMPERATURE: 98.1 F | SYSTOLIC BLOOD PRESSURE: 138 MMHG | RESPIRATION RATE: 16 BRPM

## 2021-04-20 DIAGNOSIS — C50.419 MALIGNANT NEOPLASM OF UPPER-OUTER QUADRANT OF BREAST IN FEMALE, ESTROGEN RECEPTOR POSITIVE, UNSPECIFIED LATERALITY (HCC): ICD-10-CM

## 2021-04-20 DIAGNOSIS — Z17.0 MALIGNANT NEOPLASM OF UPPER-OUTER QUADRANT OF BREAST IN FEMALE, ESTROGEN RECEPTOR POSITIVE, UNSPECIFIED LATERALITY (HCC): ICD-10-CM

## 2021-04-20 PROCEDURE — 99213 OFFICE O/P EST LOW 20 MIN: CPT | Performed by: INTERNAL MEDICINE

## 2021-04-20 PROCEDURE — 99211 OFF/OP EST MAY X REQ PHY/QHP: CPT | Performed by: INTERNAL MEDICINE

## 2021-04-20 RX ORDER — LOSARTAN POTASSIUM 50 MG/1
1 TABLET ORAL DAILY
COMMUNITY
Start: 2021-03-11

## 2021-04-20 NOTE — PROGRESS NOTES
DIAGNOSIS:   1. DCIS of the left breast  2. The tumor is upstaged to stage 1 (T1c,N0,M0) upon surgery. Tumor measured 2.0 cm. It is ER positive , HER 2 negative. 3. Oncotype test was done and showed high recurrence score of 39, reflecting chances of recurrence of 27%. 4. BRCA testing is negative  CURRENT THERAPY:  1. Initial biopsy showed DCIS  2. Upon diagnosis of invasive tumor, the patient elected to go to bilateral mastectomy. With reconstruction. Done on 2/18/2014. 3. Adjuvant chemotherapy with TC  4. Adjuvant tamoxifen started 07/2014, completed 01/2019  5. Continue Prolia to end of 2019  BRIEF CASE HISTORY:   Ms. Tamala Gaucher is a very pleasant 50 y.o. female with minimal health problems. She was doing fine with routine mammogram being normal until this year where she had suspicious abnormalities in the mammogram done on 12/2/2013. That was followed by diagnostic study and that showed new calcifications in the 12:00 position of the left breast extending through the left upper outer quadrant, which was ultimately new compared to previous imaging. A biopsy was performed on 1/10/2014 and unfortunately that was positive for ductal carcinoma in situ, High grade. It was ER positive. The patient had another lesion that was biopsied and at least 4 cm away from the original biopsy and that was also positive for DCIS same type. The patient was seen by surgery, who elected to go for surgery, but during evaluation. It was obvious that she has more disease. The patient elected to go for bilateral mastectomy with reconstruction. This was done in 2/2014. Pathology showed invasive ductal carcinoma, measuring 2.0 cm. It was moderately differentiated. ER and VA positive, and HER2 is negative. [place] nodes were removed and were negative (T1c, N0,M0) right breast was cancer free. We decided to proceed with Oncotype study.  The results came back, with a high recurrence score of 39, reflecting 27% of chances of distant recurrence at the next 5 years, assuming tamoxifen treatment. I had a long discussion with the patient, this is a high risk of recurrence and the patient would be eligible for adjuvant chemotherapy. She agreed to be treated with TC adjuvant chemotherapy. Chemotherapy was generally well tolerated. She finished 4 cycles and then was started on tamoxifen 07/2014. Vaginal dryness and bleeding, frequent yeast infections - plan to hold Tamoxifen 8 weeks 01/2019, completed therapy. DEXA 01/2019 was stable, plan for 1 more year of Prolia. INTERIM HISTORY: The patient is seen today for follow up for breast surveillance. She continues to have occasional pinching around implants, she had surgery scheduled with plastic surgeon but was cancelled due to doctor switched health plans. She was recently started on Losartin. PAST MEDICAL HISTORY: has a past medical history of Hyperlipidemia; Allergic rhinitis; MVP (mitral valve prolapse); Cancer; Hepatic steatosis; Port catheter in place; and Port catheter in place. PAST SURGICAL HISTORY: has past surgical history that includes Mastectomy (Bilateral, 02/19/14) and Breast reconstruction (Bilateral, 02/19/14). CURRENT MEDICATIONS:  has a current medication list which includes the following prescription(s): prochlorperazine, omeprazole, magic mouthwash, sennosides-docusate sodium, and oxycodone-acetaminophen. ALLERGIES:  is allergic to adhesive tape and codeine. FAMILY HISTORY: Negative for any hematological or oncological conditions. SOCIAL HISTORY:  reports that she has never smoked. She has never used smokeless tobacco. She reports that she does not drink alcohol or use illicit drugs. REVIEW OF SYSTEMS:     General: No fever or night sweats. Weight is stable. Hot flashes are unchanged. Eyes: No double or blurred vision.   Ears: No tinnitus or hearing problem,    Throat: No dysphagia or sore throat   Respiratory: No chest pain, no shortness of breath, no cough or hemoptysis. Cardiovascular: Denies chest pain, PND or orthopnea. No L E swelling or palpitations. Gastrointestinal: No nausea and no vomiting, no  abdominal pain, diarrhea or constipation. She has mild heartburn and prilosec helps. Genitourinary: Denies dysuria, hematuria, frequency, urgency. Occasional stress incontinence - worse  Gynecological: Some dryness and bleeding as noted above, frequent yeast infections  Neurological: Denies headaches, decreased LOC, no sensory or motor focal deficits. Musculoskeletal: No arthralgia no back pain or joint swelling, mild discomfort in the shoulder and pectroalis area, relieved by rest  Skin: There are hives present, no bleeding. Psychiatric: No anxiety, no depression. Endocrine: No diabetes or thyroid disease. Hematologic: No bleeding, no adenopathy. PHYSICAL EXAM:  The patient is not in acute distress. Vital signs: Blood pressure 138/87, pulse 66, temperature 98.1 °F (36.7 °C), temperature source Oral, resp. rate 16, weight 156 lb 4.8 oz (70.9 kg), last menstrual period 04/03/2021, not currently breastfeeding. , last menstrual period 03/01/2014. HEENT:  Eyes are normal. Ears, nose and throat are normal.  Neck: Supple. No lymph node enlargement. No thyroid enlargement. Trachea is centrally located. Chest: Clear to auscultation. No wheezes or crepitations. Breast: Hives on upper part of the chest, Bilateral breast implants, well healed, n masses, no lesions, tattoos are in place Heart: Regular sinus rhythm. Abdomen: Soft, nontender. No hepatosplenomegaly. No masses. Extremities:  With no edema. Lymph Nodes:  No cervical, axillary or inguinal lymph node enlargement. No lymphedema. Neurologic: Conscious and oriented. No focal neurological deficits. Psychosocial: No depression, anxiety or stress. Skin: No rashes, bruises or ecchymoses.        REVIEW OF LABORATORY DATA:   Lab Results   Component Value Date    WBC 5.8 11/06/2020    HGB 15.5 (H) 11/06/2020    HCT 44.6 11/06/2020    MCV 87.5 11/06/2020     11/06/2020       Chemistry        Component Value Date/Time     11/06/2020 0945    K 3.9 11/06/2020 0945     11/06/2020 0945    CO2 26 11/06/2020 0945    BUN 16 11/06/2020 0945    CREATININE 0.67 11/06/2020 0945        Component Value Date/Time    CALCIUM 9.7 01/31/2019 1528    ALKPHOS 45 10/19/2017 1226    AST 24 10/19/2017 1226    ALT 20 10/19/2017 1226    BILITOT 0.43 10/19/2017 1226        REVIEW OF RADIOLOGICAL RESULTS:  01/22/2019 DEXA:   T-SCORE:   06/2015 01/2019  LUMBAR:    -2.1  -1.9  LEFT HIP:  0.3  -0.2  FEMORAL NECK:   0.2  -0.4      IMPRESSION:   1. Stage 1(T1c, N0,M0) invasive ductal cancer, ER positive, HER2 negative. S/P bilateral mastectomy   2. Mitral valve prolapse, stable. 3. On tamoxifen, well tolerated, vaginal dryness - held for 8 weeks 01/2019, competed therapy   4. Osteopenia, on Prolia, off during 2018, plan for 1 more year 2019     PLAN:   1. We discussed her concerns regarding implants, she would still like to consult with a surgeon and I am referring her to Dr. Army Egan. 2. Breast exam is negative for evidence of recurrence. 3. She is doing well with no symptoms and remains in remission. 4. We will continue with surveillance. 5. Return in 1 year.

## 2021-04-20 NOTE — TELEPHONE ENCOUNTER
AVS from 4/20/21    rv in one year.  Refer to Dr. Kathya Ariza for consultation    RV scheduled 4/21/22 @ 3:45pm    PT was given AVS and an appt schedule    Electronically signed by Patience Rucker on 4/20/2021 at 4:34 PM

## 2021-05-24 ENCOUNTER — PROCEDURE VISIT (OUTPATIENT)
Dept: OBGYN CLINIC | Age: 56
End: 2021-05-24
Payer: COMMERCIAL

## 2021-05-24 ENCOUNTER — HOSPITAL ENCOUNTER (OUTPATIENT)
Age: 56
Setting detail: SPECIMEN
Discharge: HOME | End: 2021-05-24
Payer: COMMERCIAL

## 2021-05-24 VITALS
BODY MASS INDEX: 27.84 KG/M2 | WEIGHT: 152.19 LBS | DIASTOLIC BLOOD PRESSURE: 62 MMHG | RESPIRATION RATE: 16 BRPM | SYSTOLIC BLOOD PRESSURE: 110 MMHG

## 2021-05-24 DIAGNOSIS — N93.9 EPISODE OF HEAVY VAGINAL BLEEDING: Primary | ICD-10-CM

## 2021-05-24 DIAGNOSIS — Z01.812 PRE-PROCEDURE LAB EXAM: ICD-10-CM

## 2021-05-24 DIAGNOSIS — Z01.419 WOMEN'S ANNUAL ROUTINE GYNECOLOGICAL EXAMINATION: ICD-10-CM

## 2021-05-24 PROCEDURE — 81025 URINE PREGNANCY TEST: CPT | Performed by: OBSTETRICS & GYNECOLOGY

## 2021-05-24 PROCEDURE — 58558 HYSTEROSCOPY BIOPSY: CPT | Performed by: OBSTETRICS & GYNECOLOGY

## 2021-05-24 NOTE — PROGRESS NOTES
[facility]  Hysteroscopy Procedure Note      Matt Burgos  5/24/2021  No LMP recorded. (Menstrual status: Other - See Notes). Chief Complaint   Patient presents with    Procedure         Blood pressure 110/62, resp. rate 16, weight 152 lb 3 oz (69 kg), not currently breastfeeding. UltraSound Findings:   Dx: AUB       Uterus: 8.8x6.0x5.1cm, anteverted heterogenous appearing myometrium   Endometrial stripe 4.4mm   Bilateral ovaries visualized and appear normal   No free fluid in pelvis         LABS:  UPT: negative    No visits with results within 6 Week(s) from this visit. Latest known visit with results is:   Hospital Outpatient Visit on 11/16/2020   Component Date Value Ref Range Status    SARS-CoV-2, MIKE 11/16/2020 Not Detected  Not Detected Final    Comment: (NOTE)  This nucleic acid amplification test was developed and its  performance characteristics determined by [employer]. Nucleic acid amplification tests include PCR and TMA. This test has  not been FDA cleared or approved. This test has been authorized by  FDA under an Emergency Use Authorization (EUA). This test is only  authorized for the duration of time the declaration that  circumstances exist justifying the authorization of the emergency use  of in vitro diagnostic tests for detection of SARS-CoV-2 virus and/or  diagnosis of COVID-19 infection under section 564(b)(1) of the Act,  21 U. S.C. 166LKH-0(H) (1), unless the authorization is terminated or  revoked sooner. When diagnostic testing is negative, the possibility of a false  negative result should be considered in the context of a patient's  recent exposures and the presence of clinical signs and symptoms  consistent with COVID-19. An individual without symptoms of COVID-  19 and who is not shedding SARS-CoV-2 vi                           dick would expect to have a  negative (not detected) result in this assay.   Performed At: [facility]  [address] [place] [id number]  Moraima Diaz MD VT:[id number]     ]    Diagnosis:    Diagnosis Orders   1. Episode of heavy vaginal bleeding  DE HYSTEROSCOPY,DX,SEP PROC    Specimen to Pathology Outpatient    VAGINITIS DNA PROBE   2. Women's annual routine gynecological examination  PAP Smear   3. Pre-procedure lab exam  POCT urine pregnancy     Patient Active Problem List    Diagnosis Date Noted    Hx of tamoxifen therapy 01/16/2018     Priority: High     [id number]  Per PT BRCA Negative      Ductal carcinoma in situ of left breast 01/31/2014     Priority: High    Malignant neoplasm of upper-outer quadrant of female breast ([address]      Priority: High     bresat Lt /  double mastectomy      MVP (mitral valve prolapse) 07/12/2013     Priority: High    Hepatic steatosis 01/31/2014     Priority: Medium    Hyperlipidemia 07/12/2013     Priority: Low    Allergic rhinitis 07/12/2013     Priority: Low    NADEEN (stress urinary incontinence, female) 01/03/2019    Overactive bladder 10/03/2017    Herpes zoster without complication 54/57/3573    Osteopenia 09/07/2015       Chaperone for Intimate Exam   Chaperone was offered and accepted as part of the rooming process.  Chaperone: Bertha Villegas          Procedure Note:  After voiding, the patient returned to the exam room where she was placed in the dorsal- lithotomy position. A bi-valve speculum was then placed into the vaginal vault allowing visualization of the cervix. Pap smear obtained, pelvic cultures obtained. The surgical field was then cleansed with betadine. Under direct visualization an allis was placed on the anterior cervical lip. The uterus was sounded to 8 cm. Endosee Flexible Hysteroscope  was then placed thru the endocervical canal and into the endometrial cavity without any complications. Normal saline was instilled to aid in visualization of the endometrial cavity anatomy, video was completed.  The optics during the case  was not limited and the entire cavity was able to be visualized. During the hysteroscopic procedure an endometrial sampling was performed without incident. Pathology is pending. The patient tolerated the procedure well, the Allis was removed off the anterior cervical lip and there was noted to be adequate hemostasis. The patient was given restrictions and will follow-up in the office in 10-14 days. She will report any abdominal pain, heavy vaginal bleeding or a temperature of greater than 100.4. Hysteroscopic Findings:                          Assessment:   Diagnosis Orders   1. Episode of heavy vaginal bleeding  VA HYSTEROSCOPY,DX,SEP PROC    Specimen to Pathology Outpatient    VAGINITIS DNA PROBE   2. Women's annual routine gynecological examination  PAP Smear   3. Pre-procedure lab exam  POCT urine pregnancy     Patient Active Problem List    Diagnosis Date Noted    Hx of tamoxifen therapy 01/16/2018     Priority: High     Overview Note:     [id number]  Per PT BRCA Negative      Ductal carcinoma in situ of left breast 01/31/2014     Priority: High    Malignant neoplasm of upper-outer quadrant of female breast ([facility] [address]      Priority: High     Overview Note:     bresat Lt /  double mastectomy      MVP (mitral valve prolapse) 07/12/2013     Priority: High    Hepatic steatosis 01/31/2014     Priority: Medium    Hyperlipidemia 07/12/2013     Priority: Low    Allergic rhinitis 07/12/2013     Priority: Low    NADEEN (stress urinary incontinence, female) 01/03/2019    Overactive bladder 10/03/2017    Herpes zoster without complication 03/35/2767    Osteopenia 09/07/2015           Recommendations:  Pathology pending  Restrictions, expectations, instructions, and follow up discussed in detail.       Lizzy Seals, DO

## 2021-05-25 DIAGNOSIS — N93.9 EPISODE OF HEAVY VAGINAL BLEEDING: ICD-10-CM

## 2021-05-25 LAB
DIRECT EXAM: ABNORMAL
Lab: ABNORMAL
SPECIMEN DESCRIPTION: ABNORMAL

## 2021-05-26 ENCOUNTER — OFFICE VISIT (OUTPATIENT)
Dept: SURGERY | Age: 56
End: 2021-05-26
Payer: COMMERCIAL

## 2021-05-26 VITALS
OXYGEN SATURATION: 99 % | HEIGHT: 62 IN | SYSTOLIC BLOOD PRESSURE: 133 MMHG | HEART RATE: 55 BPM | WEIGHT: 154 LBS | BODY MASS INDEX: 28.34 KG/M2 | DIASTOLIC BLOOD PRESSURE: 87 MMHG

## 2021-05-26 DIAGNOSIS — Z85.3 HISTORY OF LEFT BREAST CANCER: ICD-10-CM

## 2021-05-26 DIAGNOSIS — N63.0 BREAST MASS: Primary | ICD-10-CM

## 2021-05-26 LAB
HPV SAMPLE: NORMAL
HPV, GENOTYPE 16: NOT DETECTED
HPV, GENOTYPE 18: NOT DETECTED
HPV, HIGH RISK OTHER: NOT DETECTED
HPV, INTERPRETATION: NORMAL
SPECIMEN DESCRIPTION: NORMAL
SURGICAL PATHOLOGY REPORT: NORMAL

## 2021-05-26 PROCEDURE — 99203 OFFICE O/P NEW LOW 30 MIN: CPT | Performed by: PLASTIC SURGERY

## 2021-05-26 RX ORDER — FLUCONAZOLE 150 MG/1
150 TABLET ORAL ONCE
Qty: 1 TABLET | Refills: 0 | Status: SHIPPED | OUTPATIENT
Start: 2021-05-26 | End: 2021-05-26

## 2021-05-26 NOTE — PROGRESS NOTES
[facility]  [facility] [address]       History and Physical     Chief Complaint   Patient presents with    New Patient     Patient would like opinion on her implants. She states her current implants are uncomfortable. HPI:   Leah Murray is a 50 y.o. female who presents with a personal history of breast cancer on the left breast.  Patient is status post bilateral mastectomies. Patient has bilateral subpectoral silicone breast implants in place. The implants were placed on 2/1920 14. They were Senterra implants the reference number is [id number] . There were 495 cc high-profile silicone implants. Patient has breast pain. Patient states that it comes and goes. Patient has tenderness to touch. Patient has pain associated with implants. Patient also has a mass on the lateral left breast where her cancer was. Patient is here for evaluation treatment. Medications:     Current Outpatient Medications   Medication Sig Dispense Refill    losartan (COZAAR) 50 MG tablet Take 1 tablet by mouth daily      Cholecalciferol (VITAMIN D) 50 MCG (2000 UT) CAPS capsule Take by mouth      Aspirin-Acetaminophen-Caffeine (EXCEDRIN PO) Take 1 tablet by mouth as needed (headaches)      fluticasone (FLONASE) 50 MCG/ACT nasal spray instill 2 sprays into each nostril once daily (Patient taking differently: instill 2 sprays into each nostril once daily  Patient uses PRN) 16 g 2    Probiotic Product (PROBIOTIC-10 PO) Take by mouth daily Indications: OTC Patient uses PRN      MULTIPLE VITAMIN PO Take 1 tablet by mouth daily       Ascorbic Acid (VITAMIN C) 250 MG tablet Take 250 mg by mouth daily Patient uses PRN      polyethylene glycol (GLYCOLAX) powder Take 17 g by mouth daily        No current facility-administered medications for this visit. Allergies:      Allergies   Allergen Reactions    Adhesive Tape Other (See Comments)     Turned skin red    Codeine      Review of Systems:   Constitutional: Negative for fever, chills, fatigue and unexpected weight change. HENT: Patient has allergic rhinitis. Eyes: Negative for pain and discharge. Respiratory: Negative for cough and shortness of breath. Cardiovascular: Patient has a history of hypertension, hyperlipidemia, mitral valve prolapse. Gastrointestinal: Patient has a history of GERD. Nanette Rued Patient has a history of hepatic steatosis  Skin: Negative for pallor and rash. Neurological: Negative for seizures, syncope and numbness. Hematological: Patient has a history of blood transfusions. Nanette Rued Psychiatric/Behavioral: Negative for behavioral problems. The patient is not nervous/anxious.       Past Medical History:   Diagnosis Date    Allergic rhinitis 7/12/2013    Cancer ([address] 2014    Lindsey Scrivener /  double mastectomy    Constipation     Dyslexia     GERD (gastroesophageal reflux disease)     Hepatic steatosis 1/31/2014    History of blood transfusion     History of chemotherapy 4/2013    4 treatments 2-3 weeks apart (4 total treatments)    History of irregular heartbeat     HTN (hypertension)     not currently on medication (11/6/20)    Hyperlipidemia 7/12/2013    MVP (mitral valve prolapse) 7/12/2013    PONV (postoperative nausea and vomiting)     Port catheter in place    [facility] in place     Stress incontinence     Wears glasses     reading glasses     Past Surgical History:   Procedure Laterality Date    BREAST BIOPSY Left     x 2    BREAST RECONSTRUCTION Bilateral 02/19/14    CENTRAL VENOUS CATHETER  7/24/14    removal of port Dr Nelson Marshall COLONOSCOPY  11/18/2017    no lesions seen    MASTECTOMY Bilateral 02/19/14    Dr Mikal Thomas HISTORY Bilateral 1/21/15    nipple reconstruction    RI COLON CA SCRN NOT  [address] IND N/A 11/18/2017    COLONOSCOPY performed by Agnieszka Owens MD at [address] (Dr Hiro Meredith)  port was removed 2015    WISDOM TOOTH EXTRACTION       Social History     Socioeconomic History    Marital status:      Spouse name: Not on file    Number of children: Not on file    Years of education: Not on file    Highest education level: Not on file   Occupational History    Not on file   Tobacco Use    Smoking status: Never Smoker    Smokeless tobacco: Never Used   Vaping Use    Vaping Use: Never used   Substance and Sexual Activity    Alcohol use: Yes     Alcohol/week: 0.0 standard drinks     Comment: rarely    Drug use: No    Sexual activity: Yes     Partners: Male   Other Topics Concern    Not on file   Social History Narrative    Not on file     Social Determinants of Health     Financial Resource Strain: Low Risk     Difficulty of Paying Living Expenses: Not hard at all   Food Insecurity: No Food Insecurity    Worried About [employer] [employer] in the Last Year: Never true    [address] in the Last Year: Never true   Transportation Needs:     Lack of Transportation (Medical):      Lack of Transportation (Non-Medical):    Physical Activity:     Days of Exercise per Week:     Minutes of Exercise per Session:    Stress:     Feeling of Stress :    Social Connections:     Frequency of Communication with Friends and Family:     Frequency of Social Gatherings with Friends and Family:     Attends Yarsanism Services:     Active Member of Clubs or Organizations:     Attends Club or Organization Meetings:     Marital Status:    Intimate Partner Violence:     Fear of Current or Ex-Partner:     Emotionally Abused:     Physically Abused:     Sexually Abused:      Family History   Problem Relation Age of Onset    High Blood Pressure Mother     Allergies Mother     Heart Disease Father         bypass x5    High Blood Pressure Father     Diabetes Father     Allergies Brother      Physical Exam:   /87 (Site: Left Upper Arm, Position: Sitting, Cuff Size:

## 2021-05-26 NOTE — LETTER
[facility]  [address] [facility] [address] [place] [address]  Phone: [phone]  Fax: [phone]           Arturo Diaz MD      May 26, 2021     Patient: Chetan Briscoe   MR Number: [id number]   YOB: 1965   Date of Visit: 5/26/2021       Dear Dr. Michael Mckeon: Thank you for referring Chetan Briscoe to me for evaluation/treatment. Below are the relevant portions of my assessment and plan of care. Impression/Plan:      Diagnosis Orders   1. Breast mass  US BREAST LIMITED LEFT    [facility], DO, General Surgery, [place]   2. History of left breast cancer  US BREAST LIMITED LEFT    [facility], DO, General Surgery, [place]     Patient Active Problem List   Diagnosis    Hyperlipidemia    Allergic rhinitis    MVP (mitral valve prolapse)    Malignant neoplasm of upper-outer quadrant of female breast ([address]    Ductal carcinoma in situ of left breast    Hepatic steatosis    Osteopenia    Herpes zoster without complication    Overactive bladder    Hx of tamoxifen therapy    NADEEN (stress urinary incontinence, female)       Plan: We will obtain an ultrasound to determine the characteristics of the mass. I discussed with her that I will refer her to Dr. Raul Jackson so she can be followed from her oncological standpoint for her breast.  I discussed with the patient that with silicone implants that they need MRI 3 years after insertion and 2 years thereafter. I discussed with the patient regarding monthly self breast exam.  I also discussed with the patient that she may need a biopsy of the mass. Patient is to follow-up with Dr. Raul Jackson first.  Then I will see her in 1 month. I also discussed that if she wishes to be started on Neurontin for her breast pain. Patient declined at this time. If you have questions, please do not hesitate to call me. I look forward to following Cielo Juarez along with you.     Sincerely,    MD Arturo Jimenez MD CC providers:   Dontae Martinez MD  [address] [facility] [address]  [address], [address] [facility]  [address]  [address] [employer]

## 2021-05-27 ENCOUNTER — HOSPITAL ENCOUNTER (OUTPATIENT)
Dept: WOMENS IMAGING | Age: 56
Discharge: HOME | End: 2021-05-29
Payer: COMMERCIAL

## 2021-05-27 DIAGNOSIS — Z85.3 HISTORY OF LEFT BREAST CANCER: ICD-10-CM

## 2021-05-27 DIAGNOSIS — N63.0 BREAST MASS: ICD-10-CM

## 2021-05-27 LAB — CYTOLOGY REPORT: NORMAL

## 2021-05-27 PROCEDURE — 76642 ULTRASOUND BREAST LIMITED: CPT

## 2021-05-28 DIAGNOSIS — N63.0 BREAST MASS: Primary | ICD-10-CM

## 2021-06-22 ENCOUNTER — HOSPITAL ENCOUNTER (OUTPATIENT)
Dept: MRI IMAGING | Age: 56
Discharge: HOME | End: 2021-06-24
Payer: COMMERCIAL

## 2021-06-22 DIAGNOSIS — N63.0 BREAST MASS: ICD-10-CM

## 2021-06-22 PROCEDURE — 6360000004 HC RX CONTRAST MEDICATION: Performed by: PLASTIC SURGERY

## 2021-06-22 PROCEDURE — 2580000003 HC RX 258: Performed by: PLASTIC SURGERY

## 2021-06-22 PROCEDURE — A9579 GAD-BASE MR CONTRAST NOS,1ML: HCPCS | Performed by: PLASTIC SURGERY

## 2021-06-22 PROCEDURE — 77049 MRI BREAST C-+ W/CAD BI: CPT

## 2021-06-22 RX ORDER — 0.9 % SODIUM CHLORIDE 0.9 %
35 INTRAVENOUS SOLUTION INTRAVENOUS ONCE
Status: COMPLETED | OUTPATIENT
Start: 2021-06-22 | End: 2021-06-22

## 2021-06-22 RX ORDER — SODIUM CHLORIDE 0.9 % (FLUSH) 0.9 %
10 SYRINGE (ML) INJECTION PRN
Status: DISCONTINUED | OUTPATIENT
Start: 2021-06-22 | End: 2021-06-25 | Stop reason: HOSPADM

## 2021-06-22 RX ADMIN — GADOTERIDOL 15 ML: 279.3 INJECTION, SOLUTION INTRAVENOUS at 14:19

## 2021-06-22 RX ADMIN — SODIUM CHLORIDE 35 ML: 9 INJECTION, SOLUTION INTRAVENOUS at 14:17

## 2021-06-22 RX ADMIN — SODIUM CHLORIDE, PRESERVATIVE FREE 10 ML: 5 INJECTION INTRAVENOUS at 14:18

## 2021-06-23 ENCOUNTER — TELEPHONE (OUTPATIENT)
Dept: FAMILY MEDICINE CLINIC | Age: 56
End: 2021-06-23

## 2021-07-02 DIAGNOSIS — N63.0 PALPABLE MASS OF BREAST: ICD-10-CM

## 2021-07-02 DIAGNOSIS — Z85.3 HISTORY OF BREAST CANCER: Primary | ICD-10-CM

## 2021-07-02 NOTE — TELEPHONE ENCOUNTER
Please let the patient know that an ultrasound guided breast biopsy is suggested. They are also requesting a right breast ultrasound. I will order these for her.

## 2021-07-16 ENCOUNTER — HOSPITAL ENCOUNTER (OUTPATIENT)
Dept: WOMENS IMAGING | Age: 56
Discharge: HOME | End: 2021-07-18
Payer: COMMERCIAL

## 2021-07-16 DIAGNOSIS — Z85.3 HISTORY OF BREAST CANCER: ICD-10-CM

## 2021-07-16 DIAGNOSIS — Z09 FOLLOW UP: ICD-10-CM

## 2021-07-16 DIAGNOSIS — N63.0 PALPABLE MASS OF BREAST: ICD-10-CM

## 2021-07-16 PROCEDURE — 76642 ULTRASOUND BREAST LIMITED: CPT

## 2021-07-19 ENCOUNTER — TELEPHONE (OUTPATIENT)
Dept: ONCOLOGY | Age: 56
End: 2021-07-19

## 2021-07-19 DIAGNOSIS — Z85.3 HX: BREAST CANCER: Primary | ICD-10-CM

## 2021-07-19 DIAGNOSIS — R92.8 ABNORMAL ULTRASOUND OF BREAST: ICD-10-CM

## 2021-07-19 NOTE — TELEPHONE ENCOUNTER
Not from 4:00pm 7/16/21. I was at [address] [facility] finishing up a results meeting with another patient when Sabino Barba was in for an ultrasound of her left breast.  Sabino Barba visibly shaken and staff requested that someone come talk to her. I have previously been her nurse navigator. [facility] radiology navigator and myself along with Dr. Angela Torres spoke with patient. We let her know that we will follow up and make sure that the next steps are taken care of. Support and encouragement given. Pt tearful and relates she is overwhelmed. Sabino Barba is appreciative of our concern and attentiveness. 7/19/21 0900  Reached out and spoke with Dr. Miguel Angel Maciel. Notified her of ultrasound on Friday. Results are in epic. Let Dr. Miguel Angel Maciel now that pt has not seen Dr. Torri Tobar yet. I can contact office and try to get her in on Monday or Tuesday of next week. I asked if Dr. Miguel Angel Maciel wants biopsy done by radiologist, wait for Dr. Torri Tobar, if mri for implant integrety is needed. Dr. Miguel Angel Maciel would like to proceed with biopsy per radiology. Order placed and radiology navigator notified. Radiology navigator will schedule biopsy. Notified Sabino Barba. That Dr. Miguel Angel Maciel would like to proceed with biopsy per radiology. [facility] will be calling her to schedule. Pt on pending.

## 2021-07-29 ENCOUNTER — HOSPITAL ENCOUNTER (OUTPATIENT)
Dept: WOMENS IMAGING | Age: 56
Discharge: HOME | End: 2021-07-31
Payer: COMMERCIAL

## 2021-07-29 VITALS — DIASTOLIC BLOOD PRESSURE: 86 MMHG | SYSTOLIC BLOOD PRESSURE: 142 MMHG | HEART RATE: 53 BPM

## 2021-07-29 DIAGNOSIS — R92.8 ABNORMAL MAMMOGRAM: ICD-10-CM

## 2021-07-29 DIAGNOSIS — Z85.3 HX: BREAST CANCER: ICD-10-CM

## 2021-07-29 DIAGNOSIS — R92.8 ABNORMAL ULTRASOUND OF BREAST: ICD-10-CM

## 2021-07-29 PROCEDURE — 76642 ULTRASOUND BREAST LIMITED: CPT

## 2021-07-29 PROCEDURE — 19083 BX BREAST 1ST LESION US IMAG: CPT

## 2021-08-02 ENCOUNTER — OFFICE VISIT (OUTPATIENT)
Dept: SURGERY | Age: 56
End: 2021-08-02
Payer: COMMERCIAL

## 2021-08-02 VITALS
RESPIRATION RATE: 12 BRPM | WEIGHT: 155 LBS | BODY MASS INDEX: 28.52 KG/M2 | HEIGHT: 62 IN | OXYGEN SATURATION: 98 % | HEART RATE: 59 BPM

## 2021-08-02 DIAGNOSIS — Z85.3 HISTORY OF LEFT BREAST CANCER: Primary | ICD-10-CM

## 2021-08-02 DIAGNOSIS — N63.20 LEFT BREAST LUMP: ICD-10-CM

## 2021-08-02 PROCEDURE — 99204 OFFICE O/P NEW MOD 45 MIN: CPT | Performed by: SURGERY

## 2021-08-03 NOTE — PROGRESS NOTES
a past surgical history that includes Mastectomy (Bilateral, 02/19/14); Breast reconstruction (Bilateral, 02/19/14); Tunneled venous port placement; Breast biopsy (Left); central venous catheter (7/24/14); other surgical history (Bilateral, 1/21/15); pr colon ca scrn not hi rsk ind (N/A, 11/18/2017); Colonoscopy (11/18/2017); [place] tooth extraction; and US BREAST BIOPSY W LOC DEVICE 1ST LESION LEFT (Left, 7/29/2021). Family History  family history includes Allergies in her brother and mother; Diabetes in her father; Heart Disease in her father; High Blood Pressure in her father and mother. Social History  Tobacco use:  reports that she has never smoked. She has never used smokeless tobacco.  Alcohol use:  reports current alcohol use. Drug use:  reports no history of drug use. Medications  Current Medications:   Current Outpatient Medications   Medication Sig Dispense Refill    losartan (COZAAR) 50 MG tablet Take 1 tablet by mouth daily      Cholecalciferol (VITAMIN D) 50 MCG (2000 UT) CAPS capsule Take by mouth      Aspirin-Acetaminophen-Caffeine (EXCEDRIN PO) Take 1 tablet by mouth as needed (headaches)      fluticasone (FLONASE) 50 MCG/ACT nasal spray instill 2 sprays into each nostril once daily (Patient taking differently: instill 2 sprays into each nostril once daily  Patient uses PRN) 16 g 2    Probiotic Product (PROBIOTIC-10 PO) Take by mouth daily Indications: OTC Patient uses PRN      MULTIPLE VITAMIN PO Take 1 tablet by mouth daily       Ascorbic Acid (VITAMIN C) 250 MG tablet Take 250 mg by mouth daily Patient uses PRN      polyethylene glycol (GLYCOLAX) powder Take 17 g by mouth daily        No current facility-administered medications for this visit. Home Medications:   Prior to Admission medications    Medication Sig Start Date End Date Taking?  Authorizing Provider   losartan (COZAAR) 50 MG tablet Take 1 tablet by mouth daily 3/11/21  Yes Historical Provider, MD lesions. Head/face:  NCAT, face symmetrical  Eyes:  PERRL, no evidence of conjunctivitis or ptosis bilaterally  Ears:  External ears and canals grossly normal, no evidence of otorrhea. Nose/Sinuses:  Nares normal. Septum midline. Mucosa normal. No external drainage noted. Mouth/Neck:  Mucosa moist.  No external oral lesions. Trachea midline. No visible masses. Lungs:  Normal chest expansion, unlabored breathing without accessory muscle use. No audible rales, rhonchi, or wheezing. Cardiovascular: S1S2. No evidence of JVD. No evidence of pulsatile masses in abdomen  Abdomen:  Soft, non-tender, no organomegaly, no masses. Musculoskeletal: No evidence of bony/muscular deformities, trauma, atrophy of either left/right upper/lower extremity. No evidence of digital clubbing or cyanosis. Neurologic:  CN 2-12 grossly intact without obvious deficits. Grossly normal sensation in all extremities. Psychiatric: appropriate judgement and insight, appropriate recall of recent and remote memory, no evidence of depression/anxiety/agitation      DIAGNOSES:   Diagnosis Orders   1. History of left breast cancer     2. Left breast lump         PLAN:  · I have recommended to the patient that we proceed with excisional biopsy. Patient is still considering revision of her implants which is not something that I would do, at this time I would recommend excisional biopsy prior to any other invasive surgery so that the pathology of the lesion in question can be determined and any other subsequent surgery or treatment can be planned, patient and  are in agreement with this. In the meantime I advised the patient to consult again with Dr. Alfredo Fontaine regarding her options for implant revision  ·       Greater than 45min were spent on preparing to see this patient as well as counseling this patient.     Electronically signed by Ana Hunter DO on 8/3/2021 at 3:35 PM

## 2021-08-09 ENCOUNTER — TELEPHONE (OUTPATIENT)
Dept: FAMILY MEDICINE CLINIC | Age: 56
End: 2021-08-09

## 2021-08-09 NOTE — TELEPHONE ENCOUNTER
Patient called wanted inform Dr Mike Strange that she made appt with Dr Zora Balbuena  on 08/18/21 to discuss removing implant and the replacing it.

## 2021-08-12 ENCOUNTER — TELEPHONE (OUTPATIENT)
Dept: ONCOLOGY | Age: 56
End: 2021-08-12

## 2021-08-18 ENCOUNTER — OFFICE VISIT (OUTPATIENT)
Dept: SURGERY | Age: 56
End: 2021-08-18
Payer: COMMERCIAL

## 2021-08-18 VITALS
OXYGEN SATURATION: 98 % | WEIGHT: 155 LBS | RESPIRATION RATE: 12 BRPM | HEIGHT: 62 IN | HEART RATE: 90 BPM | BODY MASS INDEX: 28.52 KG/M2

## 2021-08-18 DIAGNOSIS — L98.9 SKIN LESION: Primary | ICD-10-CM

## 2021-08-18 DIAGNOSIS — Z85.3 HISTORY OF LEFT BREAST CANCER: ICD-10-CM

## 2021-08-18 PROCEDURE — 99213 OFFICE O/P EST LOW 20 MIN: CPT | Performed by: PLASTIC SURGERY

## 2021-08-18 NOTE — PROGRESS NOTES
[facility]  [address] [facility] [address]       History and Physical     Chief Complaint   Patient presents with    Breast Pain     implant removal        HPI:   Kailee Ponce is a 49 y.o. female who presents with a personal history of breast cancer on the left breast.  Patient is status post bilateral mastectomies. Patient has bilateral subpectoral silicone breast implants in place. The implants were placed on 2/1920 14. They were Senterra implants the reference number is [id number] . There were 495 cc high-profile silicone implants. Patient has breast pain. Patient states that it comes and goes. Patient has tenderness to touch. Patient has pain associated with implants. Patient also has a mass on the lateral left breast where her cancer was. Patient is here for evaluation treatment. Medications:     Current Outpatient Medications   Medication Sig Dispense Refill    losartan (COZAAR) 50 MG tablet Take 1 tablet by mouth daily      Cholecalciferol (VITAMIN D) 50 MCG (2000 UT) CAPS capsule Take by mouth      Aspirin-Acetaminophen-Caffeine (EXCEDRIN PO) Take 1 tablet by mouth as needed (headaches)      fluticasone (FLONASE) 50 MCG/ACT nasal spray instill 2 sprays into each nostril once daily (Patient taking differently: instill 2 sprays into each nostril once daily  Patient uses PRN) 16 g 2    Probiotic Product (PROBIOTIC-10 PO) Take by mouth daily Indications: OTC Patient uses PRN      MULTIPLE VITAMIN PO Take 1 tablet by mouth daily       Ascorbic Acid (VITAMIN C) 250 MG tablet Take 250 mg by mouth daily Patient uses PRN      polyethylene glycol (GLYCOLAX) powder Take 17 g by mouth daily        No current facility-administered medications for this visit. Allergies:      Allergies   Allergen Reactions    Adhesive Tape Other (See Comments)     Turned skin red    Codeine      Review of Systems:   Constitutional: Negative for fever, chills, fatigue and unexpected weight change. HENT: Patient has allergic rhinitis. Eyes: Negative for pain and discharge. Respiratory: Negative for cough and shortness of breath. Cardiovascular: Patient has a history of hypertension, hyperlipidemia, mitral valve prolapse. Gastrointestinal: Patient has a history of GERD. Sherylhie Bevels Patient has a history of hepatic steatosis  Skin: Negative for pallor and rash. Neurological: Negative for seizures, syncope and numbness. Hematological: Patient has a history of blood transfusions. [facility] Psychiatric/Behavioral: Negative for behavioral problems. The patient is not nervous/anxious.       Past Medical History:   Diagnosis Date    Allergic rhinitis 7/12/2013    Cancer ([address] 2014    Erica Lick /  double mastectomy    Constipation     Dyslexia     GERD (gastroesophageal reflux disease)     Hepatic steatosis 1/31/2014    History of blood transfusion     History of chemotherapy 4/2013    4 treatments 2-3 weeks apart (4 total treatments)    History of irregular heartbeat     HTN (hypertension)     not currently on medication (11/6/20)    Hyperlipidemia 7/12/2013    MVP (mitral valve prolapse) 7/12/2013    PONV (postoperative nausea and vomiting)     Port catheter in place    [facility] catheter in place     Stress incontinence     Wears glasses     reading glasses     Past Surgical History:   Procedure Laterality Date    BREAST BIOPSY Left     x 2    BREAST RECONSTRUCTION Bilateral 02/19/14    CENTRAL VENOUS CATHETER  7/24/14    removal of port Dr Bartolo Ram COLONOSCOPY  11/18/2017    no lesions seen    MASTECTOMY Bilateral 02/19/14    Dr Metcalf Slovenian HISTORY Bilateral 1/21/15    nipple reconstruction    CT COLON CA SCRN NOT  [address] [facility] N/A 11/18/2017    COLONOSCOPY performed by Stefan Quigley MD at [address]      (Dr Juaquin Potter)  port was removed 2015     BREAST NEEDLE BIOPSY LEFT Left 7/29/2021     BREAST NEEDLE BIOPSY LEFT 7/29/2021 [address] [place] [address] Andrea    WISDOM TOOTH EXTRACTION       Social History     Socioeconomic History    Marital status:      Spouse name: Not on file    Number of children: Not on file    Years of education: Not on file    Highest education level: Not on file   Occupational History    Not on file   Tobacco Use    Smoking status: Never Smoker    Smokeless tobacco: Never Used   Vaping Use    Vaping Use: Never used   Substance and Sexual Activity    Alcohol use: Yes     Alcohol/week: 0.0 standard drinks     Comment: rarely    Drug use: No    Sexual activity: Yes     Partners: Male   Other Topics Concern    Not on file   Social History Narrative    Not on file     Social Determinants of Health     Financial Resource Strain: Low Risk     Difficulty of Paying Living Expenses: Not hard at all   Food Insecurity: No Food Insecurity    Worried About [address] [employer] in the Last Year: Never true    [facility] in the Last Year: Never true   Transportation Needs:     Lack of Transportation (Medical):      Lack of Transportation (Non-Medical):    Physical Activity:     Days of Exercise per Week:     Minutes of Exercise per Session:    Stress:     Feeling of Stress :    Social Connections:     Frequency of Communication with Friends and Family:     Frequency of Social Gatherings with Friends and Family:     Attends [employer] Services:     Active Member of Clubs or Organizations:     Attends Club or Organization Meetings:     Marital Status:    Intimate Partner Violence:     Fear of Current or Ex-Partner:     Emotionally Abused:     Physically Abused:     Sexually Abused:      Family History   Problem Relation Age of Onset    High Blood Pressure Mother     Allergies Mother     Heart Disease Father         bypass x5    High Blood Pressure Father     Diabetes Father     Allergies Brother      Physical Exam:   Pulse 90   Resp 12   Ht 5' 2\" (1.575 m)   Wt 155 lb (70.3 kg)   SpO2 98%   BMI 28.35 kg/m²    Body mass index is 28.35 kg/m². Physical Exam   Nursing note and vitals reviewed. Constitutional: Oriented to person, place, and time. Appears well-developed and well-nourished. No distress. Head: Normocephalic and atraumatic. Eyes: Conjunctivae and EOM are normal.   Pulmonary/Chest: Effort normal. No respiratory distress. Neurological: Alert and oriented to person, place, and time. Skin: Well-healed scars. Breast: There is animation deformity due to submuscular placement. Patient has palpable nodule on the lateral aspect of the left breast.. Psychiatric: Normal mood and affect. Behavior is normal    Imaging:           Impression/Plan:      Diagnosis Orders   1. Skin lesion  Janeth Suggs MD, Dermatology, [place]   2. History of left breast cancer       Patient Active Problem List   Diagnosis    Hyperlipidemia    Allergic rhinitis    MVP (mitral valve prolapse)    Malignant neoplasm of upper-outer quadrant of female breast ([address]    Ductal carcinoma in situ of left breast    Hepatic steatosis    Osteopenia    Herpes zoster without complication    Overactive bladder    Hx of tamoxifen therapy    NADEEN (stress urinary incontinence, female)       Plan:    I discussed with the patient that with silicone implants that they need MRI 5 years after insertion and 5 years thereafter. I discussed with the patient regarding monthly self breast exam.  I also discussed with the patient that she may need a biopsy of the mass. Patient  to follow-up with Dr. Rainer Dallas first.  Then I will see her in 1 month. We will determine what the final pathology of the masses. I also discussed with her that if there is no issues with the implant I do not recommend replacing them. I also asked her to call the  to determine if there is a recall of the implants.          Electronically signed by:  Nate Portillo MD 8/18/2021

## 2021-08-22 ENCOUNTER — NURSE TRIAGE (OUTPATIENT)
Dept: OTHER | Facility: CLINIC | Age: 56
End: 2021-08-22

## 2021-08-22 NOTE — TELEPHONE ENCOUNTER
Reason for Disposition   General information question, no triage required and triager able to answer question    Answer Assessment - Initial Assessment Questions  1. REASON FOR CALL or QUESTION: \"What is your reason for calling today? \" or \"How can I best help you? \" or \"What question do you have that I can help answer? \"      Caller needs help logging into Away[employer], helped set up [employer] for caller and explained E-visit.     Protocols used: INFORMATION ONLY CALL - NO TRIAGE-ADULT-

## 2021-08-23 ENCOUNTER — TELEPHONE (OUTPATIENT)
Dept: FAMILY MEDICINE CLINIC | Age: 56
End: 2021-08-23

## 2021-08-23 ENCOUNTER — TELEPHONE (OUTPATIENT)
Dept: ONCOLOGY | Age: 56
End: 2021-08-23

## 2021-08-23 NOTE — TELEPHONE ENCOUNTER
Patient will need to quarantine for at least 10 days since onset of symptoms and may need to quarantine longer if her symptoms do not improve. Patient is to go to ER if her symptoms worsen. She should isolate herself from other household members and everyone in the home should wear facemasks in the home. If her  is symptomatic, he should be tested for COVID-19 also. He can be tested at the [facility] in [place].

## 2021-08-23 NOTE — TELEPHONE ENCOUNTER
Called and spoke Jose Meaghans. She was unable to have area biopsied by [facility], she has seen Dr. Maeve Mortensen and Dr. Mark Nick. She relates that she is not sure if she is going to have anything done with the implants, but Dr. Maeve Mortensen recommends removal of the area in the left breast excisional biopsy. Dr. Mark Nick is recommending that be evaluated first before any other plans are made. Pt relates she is agreeable to having the excisional biopsy. She is recovering from a head cold and just wants to get feeling better before she schedules anything. I let her know that she can call Dr. Thad Hernandez office and let them know when she is ready to move forward with the excisional biopsy of the left breast, or she can call me and I will be happy to assist.  Pt declines wanting me to call at this time. Pt on pending.

## 2021-08-23 NOTE — TELEPHONE ENCOUNTER
----- Message from Ilana Salcido sent at 8/23/2021  3:26 PM EDT -----  Subject: Message to Provider    QUESTIONS  Information for Provider? She was tested for Covid yesterday and it came   back positive. is there anyhting she should be doing or does she need to   be seen. Having mild symptoms of sinus drainage and loose stools ( which   she always has soft stools) Please advise She does get chest cold easy. Should her  be tested and where can he go.   ---------------------------------------------------------------------------  --------------  CALL BACK INFO  What is the best way for the office to contact you? OK to leave message on   voicemail  Preferred Call Back Phone Number? [phone]  ---------------------------------------------------------------------------  --------------  SCRIPT ANSWERS  Relationship to Patient?  Self

## 2021-09-01 ENCOUNTER — OFFICE VISIT (OUTPATIENT)
Dept: FAMILY MEDICINE CLINIC | Age: 56
End: 2021-09-01
Payer: COMMERCIAL

## 2021-09-01 VITALS
DIASTOLIC BLOOD PRESSURE: 87 MMHG | OXYGEN SATURATION: 96 % | TEMPERATURE: 97.2 F | HEART RATE: 68 BPM | SYSTOLIC BLOOD PRESSURE: 130 MMHG

## 2021-09-01 DIAGNOSIS — U07.1 COVID-19: ICD-10-CM

## 2021-09-01 DIAGNOSIS — J01.90 ACUTE NON-RECURRENT SINUSITIS, UNSPECIFIED LOCATION: Primary | ICD-10-CM

## 2021-09-01 PROCEDURE — 99213 OFFICE O/P EST LOW 20 MIN: CPT | Performed by: FAMILY MEDICINE

## 2021-09-01 RX ORDER — AMOXICILLIN AND CLAVULANATE POTASSIUM 875; 125 MG/1; MG/1
1 TABLET, FILM COATED ORAL 2 TIMES DAILY
Qty: 14 TABLET | Refills: 0 | Status: SHIPPED | OUTPATIENT
Start: 2021-09-01 | End: 2021-09-08

## 2021-09-01 ASSESSMENT — ENCOUNTER SYMPTOMS
RHINORRHEA: 1
NAUSEA: 0
SINUS PRESSURE: 1
ABDOMINAL PAIN: 0
SORE THROAT: 0
SHORTNESS OF BREATH: 1
COUGH: 1
SINUS COMPLAINT: 1
VOMITING: 0
DIARRHEA: 0

## 2021-09-01 NOTE — LETTER
[facility]   [address] [facility] [address] [facility] [address]  Phone: [phone]  Fax: [phone]    Yvan Jha MD        September 1, 2021     Patient: Daria Roberto   YOB: 1965   Date of Visit: 9/1/2021       To Whom it May Concern:    Bashir Mendoza was seen in my clinic on 9/1/2021. She is to be excused from work 9/2/21-9/3/21. If you have any questions or concerns, please don't hesitate to call.     Sincerely,           Yvan Jha MD

## 2021-09-01 NOTE — PATIENT INSTRUCTIONS
Patient Education        Sinusitis: Care Instructions  Your Care Instructions     Sinusitis is an infection of the lining of the sinus cavities in your head. Sinusitis often follows a cold. It causes pain and pressure in your head and face. In most cases, sinusitis gets better on its own in 1 to 2 weeks. But some mild symptoms may last for several weeks. Sometimes antibiotics are needed. Follow-up care is a key part of your treatment and safety. Be sure to make and go to all appointments, and call your doctor if you are having problems. It's also a good idea to know your test results and keep a list of the medicines you take. How can you care for yourself at home? · Take an over-the-counter pain medicine, such as acetaminophen (Tylenol), ibuprofen (Advil, Motrin), or naproxen (Aleve). Read and follow all instructions on the label. · If the doctor prescribed antibiotics, take them as directed. Do not stop taking them just because you feel better. You need to take the full course of antibiotics. · Be careful when taking over-the-counter cold or flu medicines and Tylenol at the same time. Many of these medicines have acetaminophen, which is Tylenol. Read the labels to make sure that you are not taking more than the recommended dose. Too much acetaminophen (Tylenol) can be harmful. · Breathe warm, moist air from a steamy shower, a hot bath, or a sink filled with hot water. Avoid cold, dry air. Using a humidifier in your home may help. Follow the directions for cleaning the machine. · Use saline (saltwater) nasal washes. This can help keep your nasal passages open and wash out mucus and bacteria. You can buy saline nose drops at a grocery store or drugstore. Or you can make your own at home by adding 1 teaspoon of salt and 1 teaspoon of baking soda to 2 cups of distilled water. If you make your own, fill a bulb syringe with the solution, insert the tip into your nostril, and squeeze gently.  Shreyas Him your nose.  · Put a hot, wet towel or a warm gel pack on your face 3 or 4 times a day for 5 to 10 minutes each time. · Try a decongestant nasal spray like oxymetazoline (Afrin). Do not use it for more than 3 days in a row. Using it for more than 3 days can make your congestion worse. When should you call for help? Call your doctor now or seek immediate medical care if:    · You have new or worse swelling or redness in your face or around your eyes.     · You have a new or higher fever. Watch closely for changes in your health, and be sure to contact your doctor if:    · You have new or worse facial pain.     · The mucus from your nose becomes thicker (like pus) or has new blood in it.     · You are not getting better as expected. Where can you learn more? Go to https://Skimbl.Beddit. org and sign in to your [employer] account. Enter Z016 in the Germmatters box to learn more about \"Sinusitis: Care Instructions. \"     If you do not have an account, please click on the \"Sign Up Now\" link. Current as of: December 2, 2020               Content Version: 12.9  © 2006-2021 Healthwise, [employer]. Care instructions adapted under license by [facility] ([facility]). If you have questions about a medical condition or this instruction, always ask your healthcare professional. [address] any warranty or liability for your use of this information.        Take augmentin as prescribed for sinus infection  If symptoms worsen or do not improve please follow-up with PCP or return to clinic

## 2021-09-01 NOTE — PROGRESS NOTES
[facility]   [address] [place] [address] SUITE Malachi Edmonds  Dept: [phone]  Dept Fax: [phone]    Dahiana Dietrich is a 49 y.o. female who presents today for her medical conditions/complaintsas noted below. Dahiana Dietrich is c/o of Sinus Problem        HPI:     Sinus Problem  This is a new problem. The current episode started 1 to 4 weeks ago (2 weeks). The problem is unchanged. There has been no fever. Associated symptoms include congestion, coughing, shortness of breath (intermittent) and sinus pressure. Pertinent negatives include no chills, ear pain or sore throat. Treatments tried: mucinex, cold meds, inhaler, vitamins. The treatment provided mild relief.      Past medical history of allergic rhinitis, GERD, hypertension, MVP  Patient was swabbed for Covid through occupational health last week and was positive but reports that they lost the swab  Past Medical History:   Diagnosis Date    Allergic rhinitis 7/12/2013    Cancer ([facility] [address] 2014    Sharl Sanam /  double mastectomy    Constipation     Dyslexia     GERD (gastroesophageal reflux disease)     Hepatic steatosis 1/31/2014    History of blood transfusion     History of chemotherapy 4/2013    4 treatments 2-3 weeks apart (4 total treatments)    History of irregular heartbeat     HTN (hypertension)     not currently on medication (11/6/20)    Hyperlipidemia 7/12/2013    MVP (mitral valve prolapse) 7/12/2013    PONV (postoperative nausea and vomiting)     Port catheter in place    [facility] catheter in place     Stress incontinence     Wears glasses     reading glasses    Past medical history reviewed and pertinent positives/negatives in the HPI    Past Surgical History:   Procedure Laterality Date    BREAST BIOPSY Left     x 2    BREAST RECONSTRUCTION Bilateral 02/19/14    CENTRAL VENOUS CATHETER  7/24/14    removal of port Dr Anjum Keene COLONOSCOPY  11/18/2017    no lesions seen  MASTECTOMY Bilateral 02/19/14    Dr Ulises Martinez OTHER SURGICAL HISTORY Bilateral 1/21/15    nipple reconstruction    NC COLON CA SCRN NOT  [address] [facility] N/A 11/18/2017    COLONOSCOPY performed by Kushal Jacob MD at [address]      (Dr Peggy Calles)  port was removed 2015    US BREAST NEEDLE BIOPSY LEFT Left 7/29/2021    US BREAST NEEDLE BIOPSY LEFT 7/29/2021 [facility]    WISDOM TOOTH EXTRACTION         Family History   Problem Relation Age of Onset    High Blood Pressure Mother     Allergies Mother     Heart Disease Father         bypass x5    High Blood Pressure Father     Diabetes Father     Allergies Brother        Social History     Tobacco Use    Smoking status: Never Smoker    Smokeless tobacco: Never Used   Substance Use Topics    Alcohol use: Yes     Alcohol/week: 0.0 standard drinks     Comment: rarely      Current Outpatient Medications   Medication Sig Dispense Refill    amoxicillin-clavulanate (AUGMENTIN) 875-125 MG per tablet Take 1 tablet by mouth 2 times daily for 7 days 14 tablet 0    losartan (COZAAR) 50 MG tablet Take 1 tablet by mouth daily      Cholecalciferol (VITAMIN D) 50 MCG (2000 UT) CAPS capsule Take by mouth      Aspirin-Acetaminophen-Caffeine (EXCEDRIN PO) Take 1 tablet by mouth as needed (headaches)      fluticasone (FLONASE) 50 MCG/ACT nasal spray instill 2 sprays into each nostril once daily (Patient taking differently: instill 2 sprays into each nostril once daily  Patient uses PRN) 16 g 2    Probiotic Product (PROBIOTIC-10 PO) Take by mouth daily Indications: OTC Patient uses PRN      MULTIPLE VITAMIN PO Take 1 tablet by mouth daily       Ascorbic Acid (VITAMIN C) 250 MG tablet Take 250 mg by mouth daily Patient uses PRN      polyethylene glycol (GLYCOLAX) powder Take 17 g by mouth daily        No current facility-administered medications for this visit.      Allergies   Allergen Reactions    Adhesive Tape Other (See Comments)     Turned skin red    Codeine        Health Maintenance   Topic Date Due    COVID-19 Vaccine (1) Never done    HIV screen  Never done    Shingles Vaccine (1 of 2) Never done    DTaP/Tdap/Td vaccine (2 - Td or Tdap) 07/20/2017    Flu vaccine (1) 09/01/2021    Potassium monitoring  11/06/2021    Creatinine monitoring  11/06/2021    Lipid screen  06/25/2024    Cervical cancer screen  05/24/2026    Colon cancer screen colonoscopy  11/18/2027    Hepatitis C screen  Completed    Hepatitis A vaccine  Aged Out    Hepatitis B vaccine  Aged Out    Hib vaccine  Aged Out    Meningococcal (ACWY) vaccine  Aged Out    Pneumococcal 0-64 years Vaccine  Aged Out       :      Review of Systems   Constitutional: Negative for chills. HENT: Positive for congestion, postnasal drip, rhinorrhea and sinus pressure. Negative for ear pain and sore throat. Respiratory: Positive for cough and shortness of breath (intermittent). Gastrointestinal: Negative for abdominal pain, diarrhea, nausea and vomiting. Musculoskeletal: Negative for myalgias. Skin: Negative for pallor and rash. Hematological: Negative for adenopathy. Objective:     Physical Exam  Vitals and nursing note reviewed. Constitutional:       Appearance: Normal appearance. HENT:      Head: Normocephalic and atraumatic. Right Ear: Hearing normal.      Left Ear: Hearing normal.      Nose: Mucosal edema and congestion present. Right Sinus: Maxillary sinus tenderness and frontal sinus tenderness present. Left Sinus: Maxillary sinus tenderness and frontal sinus tenderness present. Mouth/Throat:      Lips: Pink. Mouth: Mucous membranes are moist.      Pharynx: Oropharynx is clear. Eyes:      Extraocular Movements: Extraocular movements intact. Conjunctiva/sclera: Conjunctivae normal.   Cardiovascular:      Rate and Rhythm: Normal rate and regular rhythm. Heart sounds: Normal heart sounds. Pulmonary:      Effort: Pulmonary effort is normal.      Breath sounds: Normal breath sounds. Musculoskeletal:      Cervical back: Normal range of motion. No muscular tenderness. Skin:     General: Skin is warm and dry. Neurological:      Mental Status: She is alert and oriented to person, place, and time. Mental status is at baseline. Psychiatric:         Mood and Affect: Mood normal.         Behavior: Behavior normal.         Thought Content: Thought content normal.         Judgment: Judgment normal.       /87 (Site: Left Upper Arm, Position: Sitting, Cuff Size: Medium Adult)   Pulse 68   Temp 97.2 °F (36.2 °C) (Infrared)   SpO2 96%     Assessment:       Diagnosis Orders   1. Acute non-recurrent sinusitis, unspecified location     2. COVID-19         Plan:      Take augmentin as prescribed for sinus infection  If symptoms worsen or do not improve please follow-up with PCP or return to clinic  No orders of the defined types were placed in this encounter. Orders Placed This Encounter   Medications    amoxicillin-clavulanate (AUGMENTIN) 875-125 MG per tablet     Sig: Take 1 tablet by mouth 2 times daily for 7 days     Dispense:  14 tablet     Refill:  0      Patient given educational materials - see patient instructions. Discussed use, benefit, and side effects of prescribed medications. All patient questions answered. Pt voiced understanding. Patient agreed with treatment plan. Follow up as directed.      Electronicallysigned by Jodie Michelle MD on 9/1/2021 at 5:05 PM

## 2021-09-03 ENCOUNTER — TELEPHONE (OUTPATIENT)
Dept: ONCOLOGY | Age: 56
End: 2021-09-03

## 2021-09-03 NOTE — TELEPHONE ENCOUNTER
Following up on patient she was going to schedule excision when she was feeling better. Noted that patient was recently in to see [facility] for sinus infection. Pt on pending.

## 2021-09-16 ENCOUNTER — TELEPHONE (OUTPATIENT)
Dept: ONCOLOGY | Age: 56
End: 2021-09-16

## 2021-09-16 NOTE — TELEPHONE ENCOUNTER
ASSISTING Esther Stockton, RN NAVIGATOR. PER LEVI'S REQUEST I CALLED PATIENT TO SEE IF SHE WAS READY OR NEEDED HELP WITH SCHEDULING WITH DR. Tatiana Henry. SHE STATES SHE IS JUST STARTING TO FEEL BETTER FROM THE SINUS INFECTION. SHE STATES SHE WILL CALL NEXT WEEK TO SCHEDULE WITH DR. Tatiana Henry AS SHE DIDNT WANT TO UNTIL SHE WAS FEELING BETTER. I TOLD HER WE WOULD KEEP AN EYE OUT FOR HER AND IF WE DONT SEE THAT SHE CALL THEM OR SCHEDULED WE WILL CHECK IN WITH HER THE FOLLOWING WEEK. Buster Scheuermann.

## 2021-09-17 ENCOUNTER — TELEPHONE (OUTPATIENT)
Dept: FAMILY MEDICINE CLINIC | Age: 56
End: 2021-09-17

## 2021-09-17 RX ORDER — FLUCONAZOLE 150 MG/1
150 TABLET ORAL ONCE
Qty: 1 TABLET | Refills: 0 | Status: SHIPPED | OUTPATIENT
Start: 2021-09-17 | End: 2021-09-17

## 2021-09-17 NOTE — TELEPHONE ENCOUNTER
----- Message from Selma Jung sent at 9/17/2021 11:25 AM EDT -----  Subject: Refill Request    QUESTIONS  Name of Medication? Other - yeast infestion  Patient-reported dosage and instructions? as needed   How many days do you have left? 0  Preferred Pharmacy? [id number]  Pharmacy phone number (if available)? [phone]  ---------------------------------------------------------------------------  --------------  CALL BACK INFO  What is the best way for the office to contact you? OK to leave message on   voicemail  Preferred Call Back Phone Number?  [phone]

## 2021-09-20 ENCOUNTER — TELEPHONE (OUTPATIENT)
Dept: FAMILY MEDICINE CLINIC | Age: 56
End: 2021-09-20

## 2021-09-20 RX ORDER — ALBUTEROL SULFATE 90 UG/1
2 AEROSOL, METERED RESPIRATORY (INHALATION) EVERY 6 HOURS PRN
Qty: 1 EACH | Refills: 1 | Status: SHIPPED | OUTPATIENT
Start: 2021-09-20

## 2021-10-19 ENCOUNTER — TELEPHONE (OUTPATIENT)
Dept: ONCOLOGY | Age: 56
End: 2021-10-19

## 2021-10-19 NOTE — TELEPHONE ENCOUNTER
ASSISTING Shaaron Lanes, RN NAVIGATOR. I CALLED TO CHECK IN WITH PATIENT TO SEE HOW SHE IS DOING AND FIND OUT IF SHE CONTACTED DR. Jen Sánchez OFFICE. LEFT MESSAGE AS TO WHY I CALLED AND ASKED THAT SHE CALLS DR. LILIA SIMS'S OFFICE, OR MYSELF. LEFT DR. LILIA SIMS'S, AND MY NUMBERS AS  CALL BACK.

## 2021-11-19 ENCOUNTER — TELEPHONE (OUTPATIENT)
Dept: ONCOLOGY | Age: 56
End: 2021-11-19

## 2021-12-01 ENCOUNTER — TELEPHONE (OUTPATIENT)
Dept: ONCOLOGY | Age: 56
End: 2021-12-01

## 2021-12-01 NOTE — TELEPHONE ENCOUNTER
Called and left message for Jose Angel Gutierrez. I let her know that I spoke with Dr. Paulino Breaux and I have some information that I need to share with her. I asked her to please call me when she can. Left my name and contact number.

## 2021-12-10 NOTE — TELEPHONE ENCOUNTER
Late entry from 12/1/21 1700. Spoke with Amando Zelaya and explained that Dr. Sarah Beth Hicks does not recommend watching the area of concern. He is still recommending that it be removed or biopsied. Amando Zelaya is surprised that is not what she thought the plan was. She relates she will reach out to office to set up follow up.

## 2021-12-22 ENCOUNTER — TELEPHONE (OUTPATIENT)
Dept: ONCOLOGY | Age: 56
End: 2021-12-22

## 2021-12-22 NOTE — TELEPHONE ENCOUNTER
ASSISTING  Michelle Thomas , RN NAVIGATOR. I CALLED TO CHECK IN WITH PATIENT TO SEE HOW SHE IS DOING AND LET HER KNOW THAT LEVI WILL BE LEAVING NAVIGATION. I SPOKE WITH PATIENT AND SHE STATES THAT SHE IS DOING WELL. SHE STATES AT THIS TIME SHE DOESN'T HAVE ANY QUESTIONS OR CONCERNS. I ENCOURAGED HER TO CONTACT DR. Bernie GEORGES'S OFFICES TO SCHEDULE FOLLOW UPS WITH THEM AS WELL. SHE STATES SHE KNOWS SHE NEEDS TO. I REMINDED HER  THAT SHE  CAN CALL DR. Augustin Crane OFFICE OR NAVIGATION  AT ANY TIME.   SHE THANKED ME FOR CALLING AND WE HUNG UP.

## 2022-01-18 RX ORDER — FLUCONAZOLE 150 MG/1
TABLET ORAL
Qty: 1 TABLET | Refills: 0 | Status: SHIPPED | OUTPATIENT
Start: 2022-01-18

## 2022-05-19 ENCOUNTER — OFFICE VISIT (OUTPATIENT)
Dept: ONCOLOGY | Age: 57
End: 2022-05-19
Payer: COMMERCIAL

## 2022-05-19 VITALS
WEIGHT: 136.7 LBS | BODY MASS INDEX: 25 KG/M2 | DIASTOLIC BLOOD PRESSURE: 89 MMHG | HEART RATE: 60 BPM | TEMPERATURE: 97.4 F | SYSTOLIC BLOOD PRESSURE: 126 MMHG

## 2022-05-19 DIAGNOSIS — Z17.0 MALIGNANT NEOPLASM OF UPPER-OUTER QUADRANT OF BREAST IN FEMALE, ESTROGEN RECEPTOR POSITIVE, UNSPECIFIED LATERALITY (HCC): Primary | ICD-10-CM

## 2022-05-19 DIAGNOSIS — C50.419 MALIGNANT NEOPLASM OF UPPER-OUTER QUADRANT OF BREAST IN FEMALE, ESTROGEN RECEPTOR POSITIVE, UNSPECIFIED LATERALITY (HCC): Primary | ICD-10-CM

## 2022-05-19 DIAGNOSIS — D05.12 DUCTAL CARCINOMA IN SITU OF LEFT BREAST: ICD-10-CM

## 2022-05-19 PROCEDURE — 99214 OFFICE O/P EST MOD 30 MIN: CPT | Performed by: INTERNAL MEDICINE

## 2022-05-19 RX ORDER — ZINC GLUCONATE 50 MG
50 TABLET ORAL DAILY
COMMUNITY

## 2022-05-19 NOTE — PROGRESS NOTES
DIAGNOSIS:   1. DCIS of the left breast  2. The tumor is upstaged to stage 1 (T1c,N0,M0) upon surgery. Tumor measured 2.0 cm. It is ER positive , HER 2 negative. 3. Oncotype test was done and showed high recurrence score of 39, reflecting chances of recurrence of 27%. 4. BRCA testing is negative  CURRENT THERAPY:  1. Initial biopsy showed DCIS  2. Upon diagnosis of invasive tumor, the patient elected to go to bilateral mastectomy. With reconstruction. Done on 2/18/2014. 3. Adjuvant chemotherapy with TC  4. Adjuvant tamoxifen started 07/2014, completed 01/2019  5. Continue Prolia to end of 2019  BRIEF CASE HISTORY:   Ms. Steve Villanueva is a very pleasant 49 y.o. female with minimal health problems. She was doing fine with routine mammogram being normal until this year where she had suspicious abnormalities in the mammogram done on 12/2/2013. That was followed by diagnostic study and that showed new calcifications in the 12:00 position of the left breast extending through the left upper outer quadrant, which was ultimately new compared to previous imaging. A biopsy was performed on 1/10/2014 and unfortunately that was positive for ductal carcinoma in situ, High grade. It was ER positive. The patient had another lesion that was biopsied and at least 4 cm away from the original biopsy and that was also positive for DCIS same type. The patient was seen by surgery, who elected to go for surgery, but during evaluation. It was obvious that she has more disease. The patient elected to go for bilateral mastectomy with reconstruction. This was done in 2/2014. Pathology showed invasive ductal carcinoma, measuring 2.0 cm. It was moderately differentiated. ER and WI positive, and HER2 is negative. [place] nodes were removed and were negative (T1c, N0,M0) right breast was cancer free. We decided to proceed with Oncotype study.  The results came back, with a high recurrence score of 39, reflecting 27% of chances of distant recurrence at the next 5 years, assuming tamoxifen treatment. I had a long discussion with the patient, this is a high risk of recurrence and the patient would be eligible for adjuvant chemotherapy. She agreed to be treated with TC adjuvant chemotherapy. Chemotherapy was generally well tolerated. She finished 4 cycles and then was started on tamoxifen 07/2014. Vaginal dryness and bleeding, frequent yeast infections - plan to hold Tamoxifen 8 weeks 01/2019, completed therapy. DEXA 01/2019 was stable, plan for 1 more year of Prolia. INTERIM HISTORY: The patient is seen today for follow up for breast surveillance. She consulted with plastic surgery for implant removed and replaced due to shaping and irregular mass, she was referred back to surgery. She underwent US, it is felt area near implant is scar tissue and too close to implant for biopsy, plan for observation as she felt 2 surgeries was excessive. PAST MEDICAL HISTORY: has a past medical history of Hyperlipidemia; Allergic rhinitis; MVP (mitral valve prolapse); Cancer; Hepatic steatosis; Port catheter in place; and Port catheter in place. PAST SURGICAL HISTORY: has past surgical history that includes Mastectomy (Bilateral, 02/19/14) and Breast reconstruction (Bilateral, 02/19/14). CURRENT MEDICATIONS:  has a current medication list which includes the following prescription(s): prochlorperazine, omeprazole, magic mouthwash, sennosides-docusate sodium, and oxycodone-acetaminophen. ALLERGIES:  is allergic to adhesive tape and codeine. FAMILY HISTORY: Negative for any hematological or oncological conditions. SOCIAL HISTORY:  reports that she has never smoked. She has never used smokeless tobacco. She reports that she does not drink alcohol or use illicit drugs. REVIEW OF SYSTEMS:     General: No fever or night sweats. Weight is stable. Hot flashes are unchanged. Eyes: No double or blurred vision.   Ears: No tinnitus or hearing problem,    Throat: No dysphagia or sore throat   Respiratory: No chest pain, no shortness of breath, no cough or hemoptysis. Cardiovascular: Denies chest pain, PND or orthopnea. No L E swelling or palpitations. Gastrointestinal: No nausea and no vomiting, no  abdominal pain, diarrhea or constipation. She has mild heartburn and prilosec helps. Genitourinary: Denies dysuria, hematuria, frequency, urgency. Occasional stress incontinence - worse  Gynecological: Some dryness and bleeding as noted above, frequent yeast infections  Neurological: Denies headaches, decreased LOC, no sensory or motor focal deficits. Musculoskeletal: No arthralgia no back pain or joint swelling, mild discomfort in the shoulder and pectroalis area, relieved by rest  Skin: There are hives present, no bleeding. Psychiatric: No anxiety, no depression. Endocrine: No diabetes or thyroid disease. Hematologic: No bleeding, no adenopathy. PHYSICAL EXAM:  The patient is not in acute distress. Vital signs: Blood pressure 126/89, pulse 60, temperature 97.4 °F (36.3 °C), temperature source Temporal, weight 136 lb 11.2 oz (62 kg), not currently breastfeeding. , last menstrual period 03/01/2014. HEENT:  Eyes are normal. Ears, nose and throat are normal.  Neck: Supple. No lymph node enlargement. No thyroid enlargement. Trachea is centrally located. Chest: Clear to auscultation. No wheezes or crepitations. Breast: Bilateral breast implants, well healed, irregularity around implant in outer edge of left breast with palpable mass, no lesions, tattoos are in place Heart: Regular sinus rhythm. Abdomen: Soft, nontender. No hepatosplenomegaly. No masses. Extremities:  With no edema. Lymph Nodes:  No cervical, axillary or inguinal lymph node enlargement. No lymphedema. Neurologic: Conscious and oriented. No focal neurological deficits. Psychosocial: No depression, anxiety or stress. Skin: No rashes, bruises or ecchymoses.        REVIEW OF LABORATORY DATA:   Lab Results   Component Value Date    WBC 5.8 11/06/2020    HGB 15.5 (H) 11/06/2020    HCT 44.6 11/06/2020    MCV 87.5 11/06/2020     11/06/2020       Chemistry        Component Value Date/Time     11/06/2020 0945    K 3.9 11/06/2020 0945     11/06/2020 0945    CO2 26 11/06/2020 0945    BUN 16 11/06/2020 0945    CREATININE 0.67 11/06/2020 0945        Component Value Date/Time    CALCIUM 9.7 01/31/2019 1528    ALKPHOS 45 10/19/2017 1226    AST 24 10/19/2017 1226    ALT 20 10/19/2017 1226    BILITOT 0.43 10/19/2017 1226        REVIEW OF RADIOLOGICAL RESULTS:  01/22/2019 DEXA:   T-SCORE:   06/2015 01/2019  LUMBAR:    -2.1  -1.9  LEFT HIP:  0.3  -0.2  FEMORAL NECK:   0.2  -0.4      IMPRESSION:   1. Stage 1(T1c, N0,M0) invasive ductal cancer, ER positive, HER2 negative. S/P bilateral mastectomy   2. Mitral valve prolapse, stable. 3. On tamoxifen, well tolerated, vaginal dryness - held for 8 weeks 01/2019, competed therapy   4. Osteopenia, on Prolia, off during 2018, plan for 1 more year 2019     PLAN:   1. We discussed implant status, recent US, and plan for observation as well as reviewing previous MRIs of the area that have been consistently negative showing likely fat necrosis. 2. I discussed further options including surgery based on negative imaging or referral to tertiary center. 3. Breast exam shows irregularity in implant edge with visible bulge in the left breast.   4. We agreed to proceed US of the left breast, she is going to consider referral.    5. We will discuss US results via phone. 6. Return in 1 year. Scribe Attestation   This note was created by Иван Rainey acting as scribe for the physician signing this note  Electronically Signed  Иван Rainey, 5/19/2022  Scribe, [employer]. Attending Attestation   Note was reviewed and edited.   I am in agreement with the note as entered    RETA FITZGERALD [facility] MD [place]  Hematologist/Medical [id number] [facility] hematology oncology physicians

## 2022-05-20 ENCOUNTER — TELEPHONE (OUTPATIENT)
Dept: ONCOLOGY | Age: 57
End: 2022-05-20

## 2022-05-20 NOTE — TELEPHONE ENCOUNTER
avs from 5/19/22     Need  Ultrasound left breast, soon  rv in one year     Pt will call and sched ultrasound. sched for next year is not built yet pt will go in recall drawer and will be called once schedule is built.      PT was given orders, scheduling instructions, AVS and an appt schedule    Electronically signed by Pantera Gaitan on 5/20/2022 at 9:19 AM

## 2022-05-25 ENCOUNTER — HOSPITAL ENCOUNTER (OUTPATIENT)
Dept: WOMENS IMAGING | Age: 57
Discharge: HOME | End: 2022-05-27
Payer: COMMERCIAL

## 2022-05-25 DIAGNOSIS — Z17.0 MALIGNANT NEOPLASM OF UPPER-OUTER QUADRANT OF BREAST IN FEMALE, ESTROGEN RECEPTOR POSITIVE, UNSPECIFIED LATERALITY (HCC): ICD-10-CM

## 2022-05-25 DIAGNOSIS — C50.419 MALIGNANT NEOPLASM OF UPPER-OUTER QUADRANT OF BREAST IN FEMALE, ESTROGEN RECEPTOR POSITIVE, UNSPECIFIED LATERALITY (HCC): ICD-10-CM

## 2022-05-25 PROCEDURE — 76642 ULTRASOUND BREAST LIMITED: CPT

## 2022-06-20 ENCOUNTER — TELEPHONE (OUTPATIENT)
Dept: INFUSION THERAPY | Age: 57
End: 2022-06-20

## 2022-06-20 NOTE — TELEPHONE ENCOUNTER
Pt called in and would like to know what her plan of care was. They already discussed her ultrasound results and he was going to discuss it with other Drs and let her know. Will check with Dr Violetta Johnson and let her know. She has no follow up appts scheduled.

## 2022-06-21 ENCOUNTER — TELEPHONE (OUTPATIENT)
Dept: ONCOLOGY | Age: 57
End: 2022-06-21

## 2022-06-21 ENCOUNTER — TELEPHONE (OUTPATIENT)
Dept: INFUSION THERAPY | Age: 57
End: 2022-06-21

## 2022-06-21 DIAGNOSIS — N63.0 BREAST MASS: ICD-10-CM

## 2022-06-21 DIAGNOSIS — Z90.13 STATUS POST BILATERAL MASTECTOMY: Primary | ICD-10-CM

## 2022-06-21 NOTE — TELEPHONE ENCOUNTER
Discussed case with Dr Fede Kenny, plan 1 year follow up with Breast MRI prior   Dr Fede Kenny placing the order   Called and LVM with patient    Bibi notified of need for 1 yr f/u  Andrew Campos RN

## 2022-07-01 ENCOUNTER — TELEPHONE (OUTPATIENT)
Dept: INFUSION THERAPY | Age: 57
End: 2022-07-01

## 2022-07-01 DIAGNOSIS — Z17.0 MALIGNANT NEOPLASM OF UPPER-OUTER QUADRANT OF BREAST IN FEMALE, ESTROGEN RECEPTOR POSITIVE, UNSPECIFIED LATERALITY (HCC): Primary | ICD-10-CM

## 2022-07-01 DIAGNOSIS — C50.419 MALIGNANT NEOPLASM OF UPPER-OUTER QUADRANT OF BREAST IN FEMALE, ESTROGEN RECEPTOR POSITIVE, UNSPECIFIED LATERALITY (HCC): Primary | ICD-10-CM

## 2022-07-01 NOTE — TELEPHONE ENCOUNTER
Pt called requesting referral for Dr. Gerardo Nelson for plastic surgery. She states she has an appt with her, but they need a referral. Faxed referral and last progress note to [phone].

## 2022-09-29 ENCOUNTER — HOSPITAL ENCOUNTER (OUTPATIENT)
Dept: MRI IMAGING | Age: 57
Discharge: HOME | End: 2022-10-01
Payer: COMMERCIAL

## 2022-09-29 DIAGNOSIS — N63.0 BREAST MASS: ICD-10-CM

## 2022-09-29 DIAGNOSIS — Z90.13 STATUS POST BILATERAL MASTECTOMY: ICD-10-CM

## 2022-09-29 PROCEDURE — A9579 GAD-BASE MR CONTRAST NOS,1ML: HCPCS | Performed by: INTERNAL MEDICINE

## 2022-09-29 PROCEDURE — 2580000003 HC RX 258: Performed by: INTERNAL MEDICINE

## 2022-09-29 PROCEDURE — 6360000004 HC RX CONTRAST MEDICATION: Performed by: INTERNAL MEDICINE

## 2022-09-29 PROCEDURE — C8908 MRI W/O FOL W/CONT, BREAST,: HCPCS

## 2022-09-29 RX ORDER — SODIUM CHLORIDE 0.9 % (FLUSH) 0.9 %
10 SYRINGE (ML) INJECTION PRN
Status: DISCONTINUED | OUTPATIENT
Start: 2022-09-29 | End: 2022-10-02 | Stop reason: HOSPADM

## 2022-09-29 RX ORDER — 0.9 % SODIUM CHLORIDE 0.9 %
50 INTRAVENOUS SOLUTION INTRAVENOUS ONCE
Status: COMPLETED | OUTPATIENT
Start: 2022-09-29 | End: 2022-09-29

## 2022-09-29 RX ADMIN — SODIUM CHLORIDE 50 ML: 9 INJECTION, SOLUTION INTRAVENOUS at 14:55

## 2022-09-29 RX ADMIN — SODIUM CHLORIDE, PRESERVATIVE FREE 10 ML: 5 INJECTION INTRAVENOUS at 14:55

## 2022-09-29 RX ADMIN — GADOTERIDOL 13 ML: 279.3 INJECTION, SOLUTION INTRAVENOUS at 14:54

## 2022-10-18 ENCOUNTER — TELEPHONE (OUTPATIENT)
Dept: INFUSION THERAPY | Age: 57
End: 2022-10-18

## 2023-01-25 ENCOUNTER — OFFICE VISIT (OUTPATIENT)
Dept: FAMILY MEDICINE CLINIC | Age: 58
End: 2023-01-25
Payer: COMMERCIAL

## 2023-01-25 VITALS
OXYGEN SATURATION: 100 % | HEART RATE: 62 BPM | DIASTOLIC BLOOD PRESSURE: 78 MMHG | TEMPERATURE: 97.3 F | SYSTOLIC BLOOD PRESSURE: 127 MMHG

## 2023-01-25 DIAGNOSIS — R11.0 NAUSEA: ICD-10-CM

## 2023-01-25 DIAGNOSIS — R19.7 DIARRHEA, UNSPECIFIED TYPE: Primary | ICD-10-CM

## 2023-01-25 PROCEDURE — 99213 OFFICE O/P EST LOW 20 MIN: CPT | Performed by: NURSE PRACTITIONER

## 2023-01-25 ASSESSMENT — ENCOUNTER SYMPTOMS
COUGH: 0
VOMITING: 0
FLATUS: 1
BLOATING: 1
ABDOMINAL PAIN: 0
DIARRHEA: 1

## 2023-01-25 NOTE — PROGRESS NOTES
[address] [address]  Dept: [phone]  Dept Fax: [phone]    Rhonda Dougherty is a 56 y.o. female who presents to the urgent care today for her medical conditions/complaints as notedbelow. Rhonda Dougherty is c/o of Diarrhea (Onset since Monday, loose stools with gas since yesterday along with upset stomach. Not taking anything for symptoms. Drinking vernors, coke cola and chicken noodle soup.)      HPI:     56 yr old female presents for nausea and loose brown stools  2 days. Diarrhea resolved but upset stomach remains. Just gassy now. Believes it was the eggplant parmigiana  and/or the wine she had when went out to dinner. Wine had vinegar like taste. Has sensitive stomach. Better now  Tolerating fluids - vernors, coke and chicken soup. Needs work note  No fevers. Diarrhea   This is a new problem. The current episode started in the past 7 days (x2d). The problem occurs 2 to 4 times per day. The problem has been resolved. Associated symptoms include bloating and increased flatus. Pertinent negatives include no abdominal pain, arthralgias, chills, coughing, fever, headaches, myalgias, sweats, URI, vomiting or weight loss. Nothing aggravates the symptoms. Risk factors include suspect food intake. She has tried nothing for the symptoms. The treatment provided significant relief. There is no history of bowel resection, inflammatory bowel disease, irritable bowel syndrome, malabsorption, a recent abdominal surgery or short gut syndrome.      Past Medical History:   Diagnosis Date    Allergic rhinitis 7/12/2013    Cancer ([facility] [address] 2014    Blinda Aimee /  double mastectomy    Constipation     Dyslexia     GERD (gastroesophageal reflux disease)     Hepatic steatosis 1/31/2014    History of blood transfusion     History of chemotherapy 4/2013    4 treatments 2-3 weeks apart (4 total treatments) History of irregular heartbeat     HTN (hypertension)     not currently on medication (11/6/20)    Hyperlipidemia 7/12/2013    MVP (mitral valve prolapse) 7/12/2013    PONV (postoperative nausea and vomiting)     Port catheter in place     [place] catheter in place     Stress incontinence     Wears glasses     reading glasses        Current Outpatient Medications   Medication Sig Dispense Refill    zinc 50 MG TABS tablet Take 50 mg by mouth daily      Apple Cider Vinegar 300 MG TABS Take by mouth daily      fluconazole (DIFLUCAN) 150 MG tablet TAKE 1 TABLET BY MOUTH ONCE FOR 1 DOSE 1 tablet 0    albuterol sulfate HFA (PROVENTIL HFA) 108 (90 Base) MCG/ACT inhaler Inhale 2 puffs into the lungs every 6 hours as needed for Wheezing (or cough) 1 each 1    losartan (COZAAR) 50 MG tablet Take 1 tablet by mouth daily      Cholecalciferol (VITAMIN D) 50 MCG (2000 UT) CAPS capsule Take by mouth      Aspirin-Acetaminophen-Caffeine (EXCEDRIN PO) Take 1 tablet by mouth as needed (headaches)      fluticasone (FLONASE) 50 MCG/ACT nasal spray instill 2 sprays into each nostril once daily (Patient taking differently: instill 2 sprays into each nostril once daily  Patient uses PRN) 16 g 2    Probiotic Product (PROBIOTIC-10 PO) Take by mouth daily Indications: OTC Patient uses PRN      MULTIPLE VITAMIN PO Take 1 tablet by mouth daily       Ascorbic Acid (VITAMIN C) 250 MG tablet Take 250 mg by mouth daily Patient uses PRN      polyethylene glycol (GLYCOLAX) powder Take 17 g by mouth daily        No current facility-administered medications for this visit. Allergies   Allergen Reactions    Adhesive Tape Other (See Comments)     Turned skin red    Codeine        Subjective:      Review of Systems   Constitutional:  Negative for chills, fever and weight loss. Respiratory:  Negative for cough. Gastrointestinal:  Positive for bloating, diarrhea and flatus. Negative for abdominal pain and vomiting.    Musculoskeletal:  Negative for arthralgias and myalgias. Neurological:  Negative for headaches. All other systems reviewed and are negative. 14 systems reviewed and negative except as listed in HPI. Objective:     Physical Exam  Vitals and nursing note reviewed. Constitutional:       General: She is not in acute distress. Appearance: Normal appearance. She is not ill-appearing, toxic-appearing or diaphoretic. HENT:      Head: Normocephalic and atraumatic. Right Ear: External ear normal.      Left Ear: External ear normal.      Mouth/Throat:      Mouth: Mucous membranes are moist.      Pharynx: Oropharynx is clear. Eyes:      General: No scleral icterus. Right eye: No discharge. Left eye: No discharge. Conjunctiva/sclera: Conjunctivae normal.   Cardiovascular:      Rate and Rhythm: Normal rate and regular rhythm. Pulses: Normal pulses. Heart sounds: Normal heart sounds. Pulmonary:      Effort: Pulmonary effort is normal.      Breath sounds: Normal breath sounds. Abdominal:      General: Bowel sounds are normal. There is no distension. Palpations: Abdomen is soft. There is no mass. Tenderness: There is no abdominal tenderness. There is no right CVA tenderness, left CVA tenderness, guarding or rebound. Hernia: No hernia is present. Musculoskeletal:      Cervical back: Neck supple. Comments: Gait steady   Skin:     General: Skin is warm and dry. Capillary Refill: Capillary refill takes less than 2 seconds. Neurological:      General: No focal deficit present. Mental Status: She is alert. Psychiatric:         Mood and Affect: Mood normal.     /78 (Site: Left Upper Arm, Position: Sitting, Cuff Size: Medium Adult)   Pulse 62   Temp 97.3 °F (36.3 °C) (Infrared)   SpO2 100%     Assessment:       Diagnosis Orders   1. Diarrhea, unspecified type        2.  Nausea            Plan:    Well appearing, had suspect food/drink at restaurant, recovering - diarrhea resolved  No fevers, vss, well appearing, still nauseated  Declined zofran  Work note given  Enc elyte replacement fluids and BRAT diet  Red flag sx reviewed  Return for make appt with Family Doc in 3-4 days for recheck. No orders of the defined types were placed in this encounter. Patient given educational materials - see patient instructions. Discussed use, benefit, and side effects of prescribed medications. All patient questions answered. Pt voicedunderstanding.     Electronically signed by SHANNA Drake CNP on 1/25/2023 at 12:35 PM

## 2023-01-25 NOTE — LETTER
[facility]  [address] [facility] [address] [address]  Phone: [phone]  Fax: [phone]    SHANNA Farooq CNP        January 25, 2023     Patient: Lubna Lewis   YOB: 1965   Date of Visit: 1/25/2023       To Whom it May Concern:    April Sari was seen in my clinic on 1/25/2023. She may return to work 1/27/2023. If you have any questions or concerns, please don't hesitate to call.     Sincerely,         SHANNA Farooq CNP

## 2023-04-24 ENCOUNTER — OFFICE VISIT (OUTPATIENT)
Dept: OBGYN CLINIC | Age: 58
End: 2023-04-24
Payer: COMMERCIAL

## 2023-04-24 ENCOUNTER — HOSPITAL ENCOUNTER (OUTPATIENT)
Age: 58
Setting detail: SPECIMEN
Discharge: HOME | End: 2023-04-24

## 2023-04-24 VITALS
WEIGHT: 152 LBS | BODY MASS INDEX: 27.97 KG/M2 | DIASTOLIC BLOOD PRESSURE: 78 MMHG | SYSTOLIC BLOOD PRESSURE: 130 MMHG | HEIGHT: 62 IN

## 2023-04-24 DIAGNOSIS — N95.0 PMB (POSTMENOPAUSAL BLEEDING): Primary | ICD-10-CM

## 2023-04-24 DIAGNOSIS — Z12.4 CERVICAL CANCER SCREENING: ICD-10-CM

## 2023-04-24 PROCEDURE — 99213 OFFICE O/P EST LOW 20 MIN: CPT | Performed by: OBSTETRICS & GYNECOLOGY

## 2023-04-24 SDOH — ECONOMIC STABILITY: INCOME INSECURITY: HOW HARD IS IT FOR YOU TO PAY FOR THE VERY BASICS LIKE FOOD, HOUSING, MEDICAL CARE, AND HEATING?: NOT HARD AT ALL

## 2023-04-24 SDOH — ECONOMIC STABILITY: FOOD INSECURITY: WITHIN THE PAST 12 MONTHS, YOU WORRIED THAT YOUR FOOD WOULD RUN OUT BEFORE YOU GOT MONEY TO BUY MORE.: NEVER TRUE

## 2023-04-24 SDOH — ECONOMIC STABILITY: FOOD INSECURITY: WITHIN THE PAST 12 MONTHS, THE FOOD YOU BOUGHT JUST DIDN'T LAST AND YOU DIDN'T HAVE MONEY TO GET MORE.: NEVER TRUE

## 2023-04-24 SDOH — ECONOMIC STABILITY: HOUSING INSECURITY
IN THE LAST 12 MONTHS, WAS THERE A TIME WHEN YOU DID NOT HAVE A STEADY PLACE TO SLEEP OR SLEPT IN A SHELTER (INCLUDING NOW)?: NO

## 2023-04-25 LAB
C TRACH DNA SPEC QL PROBE+SIG AMP: NEGATIVE
CANDIDA SPECIES, DNA PROBE: NEGATIVE
GARDNERELLA VAGINALIS, DNA PROBE: NEGATIVE
HPV SAMPLE: NORMAL
HPV, GENOTYPE 16: NOT DETECTED
HPV, GENOTYPE 18: NOT DETECTED
HPV, HIGH RISK OTHER: NOT DETECTED
HPV, INTERPRETATION: NORMAL
N GONORRHOEA DNA SPEC QL PROBE+SIG AMP: NEGATIVE
SOURCE: NORMAL
SPECIMEN DESCRIPTION: NORMAL
SPECIMEN DESCRIPTION: NORMAL
TRICHOMONAS VAGINALIS DNA: NEGATIVE

## 2023-04-26 NOTE — PROGRESS NOTES
Imaging-assisted review)    Clinical History  Z01.419 Routine gyn exam without abnormal findings  Co-Test:  ThinPrep Pap with high risk HPV testing    GYNECOLOGIC CYTOLOGY REPORT    Patient Name: Jose Angel Camera  Med Rec: [id number]  Path Number: [id number]  [address] [place] [address], [address]. [facility], [address]  [phone]  Fax: [phone]     Surgical Pathology   Result Value Ref Range    Surgical Pathology Report       -- Diagnosis --  ENDOMETRIUM, BIOPSY:  -WEAKLY PROLIFERATIVE ENDOMETRIUM, NEGATIVE FOR HYPERPLASIA AND  CARCINOMA. -SEPARATE BENIGN PIECES OF ENDOCERVICAL GLANDULAR MUCOSA. Ab Troncoso M.D  **Electronically Signed Out**         des/5/26/2021       Clinical Information  Pre-op Diagnosis:  EPISODE OF HEAVY VAGINAL BLEEDING   Operative Findings:  ENDOMETRIAL BX    Source of Specimen  1: EMB    Gross Description  \"JESSICA MEGAN, EMB\" Blood-tinged mucinous material, 1.0 x 0.1 x 0.1 cm  in aggregate. Entirely 1cs. mpb tm       Microscopic Description  Microscopic examination performed. SURGICAL PATHOLOGY CONSULTATION       Patient Name: Jose Angel Camera  Med Rec: [id number]  Path Number: [id number]    [address] [place] [address], [address]. [facility], [address]  [phone]  Fax: [phone]     Human papillomavirus (HPV) DNA probe thin prep high risk   Result Value Ref Range    Specimen Description . CERVIX     HPV Sample . THIN PREP     HPV, Genotype 16 Not Detected Not Detected    HPV, Genotype 18 Not Detected Not Detected    HPV, High Risk Other Not Detected Not Detected    HPV, Interpretation               Assessment:   Diagnosis Orders   1. PMB (postmenopausal bleeding)  US PELVIS COMPLETE    US NON OB TRANSVAGINAL    PAP SMEAR    Vaginitis DNA Probe    Chlamydia/GC DNA, Thin Prep      2.  Cervical cancer screening  PAP SMEAR        Patient Active Problem List

## 2023-04-28 LAB — CYTOLOGY REPORT: NORMAL

## 2023-04-30 DIAGNOSIS — N95.0 POSTMENOPAUSAL BLEEDING: ICD-10-CM

## 2023-04-30 DIAGNOSIS — Z92.29 HX OF TAMOXIFEN THERAPY: Primary | ICD-10-CM

## 2023-09-19 ENCOUNTER — TELEPHONE (OUTPATIENT)
Dept: INFUSION THERAPY | Age: 58
End: 2023-09-19

## 2023-09-19 DIAGNOSIS — C50.419 MALIGNANT NEOPLASM OF UPPER-OUTER QUADRANT OF BREAST IN FEMALE, ESTROGEN RECEPTOR POSITIVE, UNSPECIFIED LATERALITY (HCC): Primary | ICD-10-CM

## 2023-09-19 DIAGNOSIS — I34.1 MVP (MITRAL VALVE PROLAPSE): ICD-10-CM

## 2023-09-19 DIAGNOSIS — Z17.0 MALIGNANT NEOPLASM OF UPPER-OUTER QUADRANT OF BREAST IN FEMALE, ESTROGEN RECEPTOR POSITIVE, UNSPECIFIED LATERALITY (HCC): Primary | ICD-10-CM

## 2023-09-19 NOTE — TELEPHONE ENCOUNTER
Pt called stating she needs a cardiologist, and is asking if Dr. Radha Escamilla has a recommendation. Dr. Radha Escamilla states he recommends Dr. Roberto Tinajero. Pt notified of above.

## 2023-11-03 ENCOUNTER — HOSPITAL ENCOUNTER (OUTPATIENT)
Age: 58
Discharge: HOME | End: 2023-11-03
Payer: COMMERCIAL

## 2023-11-03 DIAGNOSIS — R79.89 LOW SERUM PROLACTIN: ICD-10-CM

## 2023-11-03 LAB — PROLACTIN SERPL-MCNC: 4 NG/ML (ref 4.79–23.3)

## 2023-11-03 PROCEDURE — 84146 ASSAY OF PROLACTIN: CPT

## 2023-11-03 PROCEDURE — 36415 COLL VENOUS BLD VENIPUNCTURE: CPT

## 2024-07-09 ENCOUNTER — INITIAL CONSULT (OUTPATIENT)
Age: 59
End: 2024-07-09
Payer: COMMERCIAL

## 2024-07-09 VITALS
BODY MASS INDEX: 28.9 KG/M2 | DIASTOLIC BLOOD PRESSURE: 91 MMHG | WEIGHT: 158 LBS | SYSTOLIC BLOOD PRESSURE: 164 MMHG | OXYGEN SATURATION: 98 % | HEART RATE: 61 BPM

## 2024-07-09 DIAGNOSIS — E78.5 HYPERLIPIDEMIA, UNSPECIFIED HYPERLIPIDEMIA TYPE: ICD-10-CM

## 2024-07-09 DIAGNOSIS — I10 HYPERTENSION, UNSPECIFIED TYPE: Primary | ICD-10-CM

## 2024-07-09 PROCEDURE — 3080F DIAST BP >= 90 MM HG: CPT | Performed by: INTERNAL MEDICINE

## 2024-07-09 PROCEDURE — 99203 OFFICE O/P NEW LOW 30 MIN: CPT | Performed by: INTERNAL MEDICINE

## 2024-07-09 PROCEDURE — 93000 ELECTROCARDIOGRAM COMPLETE: CPT | Performed by: INTERNAL MEDICINE

## 2024-07-09 PROCEDURE — 3077F SYST BP >= 140 MM HG: CPT | Performed by: INTERNAL MEDICINE

## 2024-07-09 RX ORDER — LOSARTAN POTASSIUM 50 MG/1
50 TABLET ORAL DAILY
Qty: 30 TABLET | Refills: 5 | Status: SHIPPED | OUTPATIENT
Start: 2024-07-09

## 2024-07-09 NOTE — PROGRESS NOTES
LIVER PROFILE:No results for input(s): \"AST\", \"ALT\", \"LABALBU\" in the last 72 hours.    EKG 7/9/2024 : sinus bradycardia, otherwise normal ecg       Past Medical and Surgical History, Problem List, Allergies, Medications, Labs, Imaging, all reviewed extensively in EMR and with the patient.    Assessment:     Essential hypertension   GERD   Hx of irregular heart beat  Hx of breast cancer s/p bilateral mastectomy       Plan:   Continue losartan  Low salt/dash diet discussed  Annual follow up in cardiology office       The patient was counseled regarding heart healthy diet, weight loss and exercise as tolerated. Patient's medications and side effects were discussed. All questions and concerns were addressed to patient's satisfaction.     Thank you for allowing me to participate in the care of this patient, please do not hesitate to call if you have any questions.      Emi Britton MD, FACC, [facility]     
Closed supracondylar fracture of left humerus, initial encounter

## 2024-07-31 ENCOUNTER — HOSPITAL ENCOUNTER (OUTPATIENT)
Age: 59
Discharge: HOME | End: 2024-07-31
Payer: COMMERCIAL

## 2024-07-31 DIAGNOSIS — E78.5 HYPERLIPIDEMIA, UNSPECIFIED HYPERLIPIDEMIA TYPE: ICD-10-CM

## 2024-07-31 LAB
CHOLEST SERPL-MCNC: 211 MG/DL (ref 0–199)
CHOLESTEROL/HDL RATIO: 4
HDLC SERPL-MCNC: 57 MG/DL
LDLC SERPL CALC-MCNC: 137 MG/DL (ref 0–100)
TRIGL SERPL-MCNC: 84 MG/DL (ref 0–149)
VLDLC SERPL CALC-MCNC: 17 MG/DL

## 2024-07-31 PROCEDURE — 36415 COLL VENOUS BLD VENIPUNCTURE: CPT

## 2024-07-31 PROCEDURE — 80061 LIPID PANEL: CPT

## 2025-04-28 DIAGNOSIS — I10 HYPERTENSION, UNSPECIFIED TYPE: ICD-10-CM

## 2025-04-29 RX ORDER — LOSARTAN POTASSIUM 50 MG/1
50 TABLET ORAL DAILY
Qty: 90 TABLET | Refills: 3 | Status: SHIPPED | OUTPATIENT
Start: 2025-04-29

## (undated) DEVICE — DEFENDO AIR WATER SUCTION AND BIOPSY VALVE KIT FOR  OLYMPUS: Brand: DEFENDO AIR/WATER/SUCTION AND BIOPSY VALVE

## (undated) DEVICE — JELLY,LUBE,STERILE,FLIP TOP,TUBE,2-OZ: Brand: MEDLINE

## (undated) DEVICE — AIRLIFE™ NASAL OXYGEN CANNULA CURVED, FLARED TIP, WITH 7 FEET (2.1 M) CRUSH RESISTANT TUBING, OVER-THE-EAR STYLE: Brand: AIRLIFE™